# Patient Record
Sex: MALE | Race: WHITE | NOT HISPANIC OR LATINO | Employment: OTHER | URBAN - METROPOLITAN AREA
[De-identification: names, ages, dates, MRNs, and addresses within clinical notes are randomized per-mention and may not be internally consistent; named-entity substitution may affect disease eponyms.]

---

## 2017-05-16 ENCOUNTER — RECORDS - HEALTHEAST (OUTPATIENT)
Dept: LAB | Facility: CLINIC | Age: 77
End: 2017-05-16

## 2017-05-16 LAB
CHOLEST SERPL-MCNC: 130 MG/DL
FASTING STATUS PATIENT QL REPORTED: ABNORMAL
HDLC SERPL-MCNC: 38 MG/DL
LDLC SERPL CALC-MCNC: 67 MG/DL
TRIGL SERPL-MCNC: 127 MG/DL

## 2018-04-24 ENCOUNTER — HOSPITAL ENCOUNTER (OUTPATIENT)
Dept: CT IMAGING | Facility: CLINIC | Age: 78
Discharge: HOME | End: 2018-04-24
Attending: FAMILY MEDICINE | Admitting: FAMILY MEDICINE
Payer: MEDICARE

## 2018-04-24 DIAGNOSIS — S22.000A: ICD-10-CM

## 2018-04-24 PROCEDURE — 72128 CT CHEST SPINE W/O DYE: CPT

## 2018-05-08 ENCOUNTER — RECORDS - HEALTHEAST (OUTPATIENT)
Dept: LAB | Facility: CLINIC | Age: 78
End: 2018-05-08

## 2018-05-08 LAB
ALP SERPL-CCNC: 102 U/L (ref 45–120)
CALCIUM SERPL-MCNC: 8.9 MG/DL (ref 8.5–10.5)
PTH-INTACT SERPL-MCNC: 50 PG/ML (ref 10–86)

## 2018-05-09 LAB — 25(OH)D3 SERPL-MCNC: 51.5 NG/ML (ref 30–80)

## 2018-06-28 ENCOUNTER — HOSPITAL ENCOUNTER (OUTPATIENT)
Dept: BONE DENSITY | Facility: CLINIC | Age: 78
Discharge: HOME | End: 2018-06-28
Admitting: FAMILY MEDICINE
Payer: MEDICARE

## 2018-06-28 DIAGNOSIS — M81.0 OSTEOPOROSIS: ICD-10-CM

## 2018-06-28 DIAGNOSIS — M54.9 BACK PAIN: ICD-10-CM

## 2018-06-28 DIAGNOSIS — E55.9 VITAMIN D DEFICIENCY: ICD-10-CM

## 2018-06-28 PROCEDURE — 77080 DXA BONE DENSITY AXIAL: CPT

## 2018-06-29 ENCOUNTER — HOSPITAL ENCOUNTER (OUTPATIENT)
Dept: GENERAL RADIOLOGY | Facility: CLINIC | Age: 78
Discharge: HOME | End: 2018-06-29
Attending: FAMILY MEDICINE | Admitting: FAMILY MEDICINE
Payer: MEDICARE

## 2018-06-29 ENCOUNTER — HOSPITAL ENCOUNTER (OUTPATIENT)
Dept: CT IMAGING | Facility: CLINIC | Age: 78
End: 2018-06-29
Attending: FAMILY MEDICINE
Payer: MEDICARE

## 2018-06-29 DIAGNOSIS — G89.29 CHRONIC RIGHT SHOULDER PAIN: ICD-10-CM

## 2018-06-29 DIAGNOSIS — M25.511 CHRONIC RIGHT SHOULDER PAIN: ICD-10-CM

## 2018-06-29 PROCEDURE — 23350 INJECTION FOR SHOULDER X-RAY: CPT

## 2018-06-29 PROCEDURE — 73201 CT UPPER EXTREMITY W/DYE: CPT | Mod: RT

## 2018-06-29 PROCEDURE — 25500064 ZZH RX 255 OP 636: Performed by: RADIOLOGY

## 2018-06-29 PROCEDURE — 25000125 ZZHC RX 250: Performed by: RADIOLOGY

## 2018-06-29 RX ORDER — IOPAMIDOL 408 MG/ML
20 INJECTION, SOLUTION INTRATHECAL ONCE
Status: COMPLETED | OUTPATIENT
Start: 2018-06-29 | End: 2018-06-29

## 2018-06-29 RX ORDER — LIDOCAINE HYDROCHLORIDE 10 MG/ML
5 INJECTION, SOLUTION EPIDURAL; INFILTRATION; INTRACAUDAL; PERINEURAL ONCE
Status: COMPLETED | OUTPATIENT
Start: 2018-06-29 | End: 2018-06-29

## 2018-06-29 RX ADMIN — LIDOCAINE HYDROCHLORIDE 5 ML: 10 INJECTION, SOLUTION EPIDURAL; INFILTRATION; INTRACAUDAL; PERINEURAL at 14:47

## 2018-06-29 RX ADMIN — IOPAMIDOL 12 ML: 408 INJECTION, SOLUTION INTRATHECAL at 14:47

## 2018-06-29 NOTE — PROGRESS NOTES
RADIOLOGY PROCEDURE NOTE  Patient name: Shaka Aguirre  MRN: [id number]  : 1940    Pre-procedure diagnosis: Pain.  Post-procedure diagnosis: Same    Procedure Date/Time: 2018  2:43 PM  Procedure: Right shoulder intraarticular contrast injection for CT to follow.  Estimated blood loss: None  Specimen(s) collected:  None.  The patient tolerated the procedure well with no immediate complications.    See imaging dictation for procedural details.    Provider name: Zeus Staley  Assistant(s):None

## 2018-09-13 ENCOUNTER — AMBULATORY - HEALTHEAST (OUTPATIENT)
Dept: NEUROLOGY | Facility: CLINIC | Age: 78
End: 2018-09-13

## 2018-09-13 DIAGNOSIS — R41.3 MEMORY LOSS: ICD-10-CM

## 2018-09-27 ENCOUNTER — HOSPITAL ENCOUNTER (OUTPATIENT)
Dept: NEUROLOGY | Facility: CLINIC | Age: 78
Setting detail: THERAPIES SERIES
Discharge: STILL ADMITTED OR ADMINISTRATIVE | End: 2018-09-27
Attending: PSYCHIATRY & NEUROLOGY

## 2018-09-27 DIAGNOSIS — R41.3 MEMORY LOSS: ICD-10-CM

## 2018-09-27 DIAGNOSIS — G31.84 MILD NEUROCOGNITIVE DISORDER: ICD-10-CM

## 2018-10-15 ENCOUNTER — HOSPITAL ENCOUNTER (OUTPATIENT)
Dept: NEUROLOGY | Facility: CLINIC | Age: 78
Setting detail: THERAPIES SERIES
Discharge: STILL ADMITTED OR ADMINISTRATIVE | End: 2018-10-15
Attending: PSYCHIATRY & NEUROLOGY

## 2018-10-15 DIAGNOSIS — G31.84 MILD NEUROCOGNITIVE DISORDER: ICD-10-CM

## 2019-01-01 ENCOUNTER — APPOINTMENT (OUTPATIENT)
Dept: GENERAL RADIOLOGY | Facility: CLINIC | Age: 79
End: 2019-01-01
Attending: STUDENT IN AN ORGANIZED HEALTH CARE EDUCATION/TRAINING PROGRAM
Payer: COMMERCIAL

## 2019-01-01 ENCOUNTER — HOSPITAL ENCOUNTER (EMERGENCY)
Facility: CLINIC | Age: 79
Discharge: HOME | End: 2019-01-01
Attending: STUDENT IN AN ORGANIZED HEALTH CARE EDUCATION/TRAINING PROGRAM | Admitting: STUDENT IN AN ORGANIZED HEALTH CARE EDUCATION/TRAINING PROGRAM
Payer: COMMERCIAL

## 2019-01-01 VITALS
TEMPERATURE: 100 F | DIASTOLIC BLOOD PRESSURE: 69 MMHG | WEIGHT: 165 LBS | HEIGHT: 66 IN | OXYGEN SATURATION: 98 % | BODY MASS INDEX: 26.52 KG/M2 | SYSTOLIC BLOOD PRESSURE: 129 MMHG | RESPIRATION RATE: 18 BRPM

## 2019-01-01 DIAGNOSIS — S60.222A CONTUSION OF LEFT HAND, INITIAL ENCOUNTER: ICD-10-CM

## 2019-01-01 PROBLEM — E87.1 HYPONATREMIA: Status: ACTIVE | Noted: 2018-10-18

## 2019-01-01 PROBLEM — I50.22 CHRONIC SYSTOLIC CONGESTIVE HEART FAILURE (H): Status: ACTIVE | Noted: 2019-01-01

## 2019-01-01 PROBLEM — G47.33 OSA ON CPAP: Status: ACTIVE | Noted: 2018-02-15

## 2019-01-01 PROBLEM — F41.8 DEPRESSION WITH ANXIETY: Status: ACTIVE | Noted: 2018-02-15

## 2019-01-01 PROBLEM — I10 HYPERTENSION: Status: ACTIVE | Noted: 2018-02-15

## 2019-01-01 PROBLEM — I48.0 PAROXYSMAL ATRIAL FIBRILLATION (H): Status: ACTIVE | Noted: 2018-02-15

## 2019-01-01 PROBLEM — E03.9 HYPOTHYROIDISM (ACQUIRED): Status: ACTIVE | Noted: 2018-02-15

## 2019-01-01 PROBLEM — Z95.810 BIVENTRICULAR ICD (IMPLANTABLE CARDIOVERTER-DEFIBRILLATOR) IN PLACE: Status: ACTIVE | Noted: 2018-10-23

## 2019-01-01 PROBLEM — Z95.2 MITRAL VALVE REPLACED: Status: ACTIVE | Noted: 2018-02-15

## 2019-01-01 PROBLEM — M81.0 AGE-RELATED OSTEOPOROSIS WITHOUT CURRENT PATHOLOGICAL FRACTURE: Status: ACTIVE | Noted: 2018-08-09

## 2019-01-01 PROBLEM — Z79.01 ANTICOAGULATION MONITORING, INR RANGE 2-3: Status: ACTIVE | Noted: 2018-02-15

## 2019-01-01 PROBLEM — D64.9 ANEMIA: Status: ACTIVE | Noted: 2018-10-18

## 2019-01-01 PROCEDURE — 99284 EMERGENCY DEPT VISIT MOD MDM: CPT | Mod: Z6 | Performed by: STUDENT IN AN ORGANIZED HEALTH CARE EDUCATION/TRAINING PROGRAM

## 2019-01-01 PROCEDURE — 73110 X-RAY EXAM OF WRIST: CPT | Mod: LT

## 2019-01-01 PROCEDURE — 73130 X-RAY EXAM OF HAND: CPT | Mod: LT

## 2019-01-01 PROCEDURE — 25000132 ZZH RX MED GY IP 250 OP 250 PS 637: Mod: GY | Performed by: STUDENT IN AN ORGANIZED HEALTH CARE EDUCATION/TRAINING PROGRAM

## 2019-01-01 PROCEDURE — A9270 NON-COVERED ITEM OR SERVICE: HCPCS | Mod: GY | Performed by: STUDENT IN AN ORGANIZED HEALTH CARE EDUCATION/TRAINING PROGRAM

## 2019-01-01 PROCEDURE — 99284 EMERGENCY DEPT VISIT MOD MDM: CPT

## 2019-01-01 RX ORDER — OXYCODONE HYDROCHLORIDE 5 MG/1
5 TABLET ORAL ONCE
Status: COMPLETED | OUTPATIENT
Start: 2019-01-01 | End: 2019-01-01

## 2019-01-01 RX ORDER — AMIODARONE HYDROCHLORIDE 200 MG/1
200 TABLET ORAL DAILY
Status: ON HOLD | COMMUNITY
End: 2022-09-23

## 2019-01-01 RX ORDER — ATORVASTATIN CALCIUM 80 MG/1
80 TABLET, FILM COATED ORAL DAILY
COMMUNITY
End: 2024-01-16

## 2019-01-01 RX ORDER — TAMSULOSIN HYDROCHLORIDE 0.4 MG/1
0.4 CAPSULE ORAL DAILY
Status: ON HOLD | COMMUNITY
End: 2022-09-23

## 2019-01-01 RX ORDER — PANTOPRAZOLE SODIUM 40 MG/1
40 TABLET, DELAYED RELEASE ORAL DAILY
Status: ON HOLD | COMMUNITY
End: 2022-09-24

## 2019-01-01 RX ORDER — SPIRONOLACTONE 25 MG/1
25 TABLET ORAL DAILY
Status: ON HOLD | COMMUNITY
End: 2022-09-23

## 2019-01-01 RX ORDER — WARFARIN SODIUM 3 MG/1
TABLET ORAL
Status: ON HOLD | COMMUNITY
End: 2022-09-24

## 2019-01-01 RX ORDER — OXYCODONE HYDROCHLORIDE 5 MG/1
2.5-5 TABLET ORAL EVERY 6 HOURS PRN
Qty: 10 TABLET | Refills: 0 | Status: ON HOLD | OUTPATIENT
Start: 2019-01-01 | End: 2022-09-23

## 2019-01-01 RX ORDER — LEVOTHYROXINE SODIUM 75 UG/1
75 TABLET ORAL DAILY
Status: ON HOLD | COMMUNITY
End: 2022-09-23

## 2019-01-01 RX ORDER — FUROSEMIDE 40 MG
40 TABLET ORAL DAILY
Status: ON HOLD | COMMUNITY
End: 2022-09-23

## 2019-01-01 RX ORDER — FERROUS SULFATE 325(65) MG
325 TABLET ORAL 2 TIMES DAILY
COMMUNITY

## 2019-01-01 RX ADMIN — OXYCODONE HYDROCHLORIDE 5 MG: 5 TABLET ORAL at 12:13

## 2019-01-01 ASSESSMENT — MIFFLIN-ST. JEOR: SCORE: 1406.19

## 2019-01-01 NOTE — LETTER
January 1, 2019      To Whom It May Concern:      Shaka Aguirre was seen in our Emergency Department today, 01/01/19.  I expect his condition to improve over the next 3-5 days.  He may return to work when improved.    Sincerely,        Federico Hillman, DO

## 2019-01-01 NOTE — ED AVS SNAPSHOT
[facility] Emergency Department  [address] [address]  Phone:  [phone]  Fax:  [phone]                                    Shaka Aguirre   MRN: [id number]    Department:  [facility] Emergency Department   Date of Visit:  1/1/2019           After Visit Summary Signature Page    I have received my discharge instructions, and my questions have been answered. I have discussed any challenges I see with this plan with the nurse or doctor.    ..........................................................................................................................................  Patient/Patient Representative Signature      ..........................................................................................................................................  Patient Representative Print Name and Relationship to Patient    ..................................................               ................................................  Date                                   Time    ..........................................................................................................................................  Reviewed by Signature/Title    ...................................................              ..............................................  Date                                               Time          22EPIC Rev 08/18

## 2019-01-01 NOTE — ED PROVIDER NOTES
History     Chief Complaint   Patient presents with     Hand Injury     left hand crushed in train station door on Saturday, decreased rom     HPI  Shaka Aguirre is a 78 year old male with past medical history which includes osteoporosis, biventricular ICD, systolic CHF, aortic and mitral valve replacement, paroxysmal atrial fibrillation and chronic anticoagulation with warfarin therapy who presents for evaluation of left hand pain after injury 3 days ago.  Patient explains that he attempted to stop a closing door on a train but it slid closed compressing his hand.  He has had persistent achy pain described as severe for the past 3 days, no obvious bony deformity or weakness, and localizes pain to the palmar and dorsal left hand.  He has taken some previously prescribed Tylenol with codeine with limited relief.  No other injuries.  No bleeding.    Problem List:    Patient Active Problem List    Diagnosis Date Noted     Chronic systolic congestive heart failure (H) 01/01/2019     Priority: Medium     Biventricular ICD (implantable cardioverter-defibrillator) in place 10/23/2018     Priority: Medium     Overview:   Date of last device in office evaluation: 7/27/18    ?  and model:   [employer] Inogen G146  BiV ICD   * Date of implant: 12/20/16              ? Indication for device: CHF; cardiomyopathy; AVR,MVR; atrial flutter/fibrillation; ventricular tachycardia; 100% V paced    ? Cardiac resynchronization therapy:  yes  ? Battery longevity documented as less than 3  Months: no  ? Are any of the leads less than 3 months old:no    ? Programming              ? Pacing mode and programmed lower rate: DDDR 60 bpm              ? ICD therapy                     ? Lowest heart rate for shock delivery: 170bpm                     ? Lowest heart rate for ATP delivery:  170bpm    ? Rate-responsive sensor type, if programmed on: accelerometer on       Underlying rhythm and heart rate if it can be  determined:  sinus bradycardia with 1st deg AVB @ 51 bpm       * For ICD, will magnet disable detection? yes  ? Will ICD detections resume automatically with removal of  the magnet? yes                                                                        ? Any alert status on CIED generator or lead: no  ? Last pacing threshold    Atrial   1V @ 0.4ms           Ventricular   RV 1V @ 0.4ms      LV 0.9V @ 0.4ms       Anemia 10/18/2018     Priority: Medium     Hyponatremia 10/18/2018     Priority: Medium     Age-related osteoporosis without current pathological fracture 08/09/2018     Priority: Medium     Overview:   DEXA at [place] 6/28/18.  L femur -3.1.       Anticoagulation monitoring, INR range 2-3 02/15/2018     Priority: Medium     Mitral valve replaced 02/15/2018     Priority: Medium     Depression with anxiety 02/15/2018     Priority: Medium     Hypertension 02/15/2018     Priority: Medium     Hypothyroidism (acquired) 02/15/2018     Priority: Medium     MARISELA on CPAP 02/15/2018     Priority: Medium     Paroxysmal atrial fibrillation (H) 02/15/2018     Priority: Medium        Past Medical History:    No past medical history on file.    Past Surgical History:    No past surgical history on file.    Family History:    No family history on file.    Social History:  Marital Status:   [2]  Social History     Tobacco Use     Smoking status: Not on file   Substance Use Topics     Alcohol use: Not on file     Drug use: Not on file        Medications:      amiodarone (PACERONE/CODARONE) 200 MG tablet   atorvastatin (LIPITOR) 80 MG tablet   ferrous sulfate (FEROSUL) 325 (65 Fe) MG tablet   furosemide (LASIX) 40 MG tablet   levothyroxine (SYNTHROID/LEVOTHROID) 75 MCG tablet   oxyCODONE (ROXICODONE) 5 MG tablet   pantoprazole (PROTONIX) 40 MG EC tablet   spironolactone (ALDACTONE) 25 MG tablet   tamsulosin (FLOMAX) 0.4 MG capsule   warfarin (COUMADIN) 3 MG tablet         Review of Systems  Constitutional:  Negative  "for fever or recent illness.  Respiratory:  Negative for cough or shortness of breath.  Musculoskeletal: Positive for left hand pain.  Neurological: Negative for weakness or sensory deficits.  Skin: Positive for contusion of the dorsal hand.    All others reviewed and are negative.      Physical Exam   BP: 129/69  Heart Rate: 69  Temp: 100  F (37.8  C)  Resp: 18  Height: 167.6 cm (5' 6\")  Weight: 74.8 kg (165 lb)  SpO2: 98 %      Physical Exam  Constitutional:  Well developed, well nourished.  Appears nontoxic and in no acute distress.   HENT:  Normocephalic and atraumatic.  Eyes:  Conjunctivae are normal.  Neck:  Neck supple.  Cardiovascular:  No cyanosis.   Respiratory:  Effort normal, no respiratory distress.   Musculoskeletal: Contusion over dorsal aspect of left hand, primarily noted along metacarpal 1-3, but tenderness of metacarpal 4 and 5.  No obvious deformity or wound..  No snuffbox tenderness.  Sensation intact of all digits along median, radial, and ulnar nerve distributions.  FDP, FDS, and Extensor tendons intact.  No cyanosis and capillary refill less than 2 seconds in each digit.  2/4 palpable radial and ulnar pulses.  Neurological:  Patient is alert.  Skin:  Skin is warm and dry.  Psychiatric:  Normal mood and affect.      ED Course        Procedures               Critical Care time:  none               Results for orders placed or performed during the hospital encounter of 01/01/19 (from the past 24 hour(s))   XR Hand Left G/E 3 Views    Narrative    HAND THREE VIEWS LEFT  1/1/2019 12:33 PM     HISTORY: pain after crush injury 3 days ago    COMPARISON: None.    FINDINGS: There is diffuse osteopenia. Degenerative changes are seen  in the DIP and PIP joints of the fifth finger. No acute fractures are  identified..      Impression    IMPRESSION:   1. No acute fractures are identified.  2. Degenerative changes.   XR Wrist Left G/E 3 Views    Narrative    WRIST THREE VIEWS LEFT 1/1/2019 12:33 PM "     HISTORY: pain after crush injury 3 days ago    COMPARISON: None.    FINDINGS: There is normal osseous alignment.  No fractures are  identified.  Vascular calcifications are noted.      Impression    IMPRESSION: No fractures are identified.       Medications   oxyCODONE (ROXICODONE) tablet 5 mg (5 mg Oral Given 1/1/19 1213)       Assessments & Plan (with Medical Decision Making)   Shaka Aguirre is a 78 year old male who presents to the emergency department for complaint of left hand pain after injury sustained 3 days ago.  Based on his symptoms and the clinical examination, there is no evidence to suggest bony deformity, skin laceration, tendon rupture, joint laxity, compartment syndrome or neurovascular deficits.  Radiographs were independently reviewed and without identifiable fracture or other acute bony abnormality.  Radiologist read is consistent.  Clinical impression is that his symptoms are likely caused by soft tissue injury and contusion noted.  He continues to take Coumadin as prescribed, INR value would be of low utility.  Volar splint placed.  Suggest treating with rest, ice, elevation, and weightbearing as tolerated.  It was recommended that they follow up if symptoms do not readily improve over the next week as it is possible to miss some injuries despite radiographic imaging.      Disclaimer:  This note consists of symbols derived from keyboarding, dictation, and/or voice recognition software.  As a result, there may be errors in the script that have gone undetected.  Please consider this when interpreting information found in the chart.         I have reviewed the nursing notes.    I have reviewed the findings, diagnosis, plan and need for follow up with the patient.          Medication List      Started    oxyCODONE 5 MG tablet  Commonly known as:  ROXICODONE  2.5-5 mg, Oral, EVERY 6 HOURS PRN            Final diagnoses:   Contusion of left hand, initial encounter       1/1/2019   [facility]     Federico Hillman DO  01/01/19 2482     160

## 2019-01-18 ENCOUNTER — HOSPITAL ENCOUNTER (OUTPATIENT)
Dept: ULTRASOUND IMAGING | Facility: CLINIC | Age: 79
Discharge: HOME | End: 2019-01-18
Attending: FAMILY MEDICINE | Admitting: FAMILY MEDICINE
Payer: COMMERCIAL

## 2019-01-18 DIAGNOSIS — D50.9 IRON (FE) DEFICIENCY ANEMIA: ICD-10-CM

## 2019-01-18 PROCEDURE — 76700 US EXAM ABDOM COMPLETE: CPT

## 2021-06-20 NOTE — PROGRESS NOTES
The patient was seen for a neuropsychological evaluation for the purposes of diagnostic clarification and treatment planning. 150 minutes of face-to-face testing were provided by this writer. The patient was cooperative with testing. No concerns were brought to my attention. Please see Dr. More's report for a detailed description of the charges and interpretation and integration of the findings.

## 2021-06-20 NOTE — PROGRESS NOTES
"  NEUROPSYCHOLOGICAL CONSULTATION    NAME:  Shaka Aguirre  :  1940    ROGER: 2018    REASON FOR REFERRAL:  Mr. Aguirre is a 78 y.o., right-handed,  male with chronic systolic heart failure, significant left ventricular systolic dysfunction, atrial fibrillation (s/p pacemaker and defibrillator implantation), hypothyroidism, obstructive sleep apnea (with CPAP), sensorimotor neuropathy, hearing loss, degenerative arthritis in his hands, and history of rheumatic fever at age 13 with subsequent rheumatic heart disease. He also has a recent onset of bilateral hand tremor, balance issues, recent falls, and \"memory loss\" within the past two years, along with more longstanding REM sleep behavior disorder. He was evaluated by neurologist, Moon Grimm MD on 2018 for his tremor and cognitive decline. A brief cognitive screen (MOCA) was administered at that time, on which he earned a score of 27/30. Dr. Grimm's evaluation was additionally notable for bilateral positional and action tremor, and his right hand also had an occasional resting tremor. She also observed compromised coordination and steppage gait on the right side and decreased balance. He was subsequently referred for this neuropsychological evaluation by Dr. Grimm to assist with differential diagnosis and care planning.     Verbal consent for neuropsychological testing was received following the provision of information about the nature and purpose of the evaluation, and the opportunity to ask questions.     HISTORY OF PRESENTING PROBLEM:  The following information was obtained via medical record review and by interview of the patient. He came to today's appointment unaccompanied.    With regard to cognitive functioning, Mr. Aguirre reported experiencing progressive cognitive decline that began 3-4 years ago. Specifically, he mostly described difficulties while driving, including getting lost in his neighborhood, having difficulty finding " his way out of parking lots, and forgetting where he parked. He added that he has always had a poor memory, particularly with regard to recalling names of people whom he had just met. He also acknowledged occasional, mild word-finding difficulty, losing things around the home, and slightly slower processing speed when specifically queried about these potential problems, but he is not overly concerned or bothered by these issues. Problems with attention/concentration or problem solving were denied. He noted that he is anxious about this evaluation because he fears he might have Alzheimer's disease.    With regard to the activities of daily living, Mr. Aguirre reported that he is fully independent. He continues to drive, despite the onset of the problems detailed above. Recent accidents, close-calls, or traffic violations were denied. He manages his medications independently with the use of a pillbox. He pays all of his bills on the computer and denied any problems with financial management. His wife has always done most of the cooking and meal preparation. He has no issues with performing household chores or running errands.    MEDICAL HISTORY:  Mr. Aguirre's medical history is significant for several longstanding cardiac issues, that began with rheumatic heart disease secondary to rheumatic fever at age 13. He has had several aortic and mitral valve replacements, the most recent of which was complicated by a sternal staph infection in 2003. He also had a single-vessel bypass at the time of his valve replacement. He also has chronic systolic heart failure, significant left ventricular systolic dysfunction, atrial fibrillation and possible flutter. He is now status-post cardiac ablation and pacemaker and defibrillator implantation.    Medical history is additionally positive for obstructive sleep apnea (with consistent CPAP use for 15 years), sensorimotor neuropathy, hypothyroidism, hearing loss, and degenerative  "arthritis in his hands. He also has a recent onset of bilateral hand tremor, balance issues, and recent falls within the past two years, seemingly concomitant with his cognitive decline. He also reported a more longstanding REM sleep behavior disorder. The patient additionally reported history of a couple of concussions, the first occurring in 1978 when he was bucked off of a horse, and the second in 2010 after falling onto a concrete floor. Residual problems were denied.    Diagnostic studies:  Head CT was ordered but not yet completed by the time of this report.     No past surgical history on file.    Current medications include (per medical record):   Tylenol, Pacerone, Lipitor, tamsulosin, escitalopram (20mg), warfarin, spironolactone, Klor-Con, Metoprolol, levothyroxine, lisinopril, and furosemide. While cyclobenzaprine is on his medication list, the patient reported that he no longer takes it.    RELEVANT FAMILY MEDICAL HISTORY:   The patient's mother reportedly also had REM sleep behavior disorder. Family neurologic history is otherwise negative, including no family history of tremor or other parkinsonisms. He does have family history of coronary artery disease. The patient is the middle child with four siblings, all of whom are healthy.     PSYCHIATRIC HISTORY:  Currently, the patient described his mood as \"depressed most of the time.\" He has reportedly felt this way for the past couple of years. He also endorsed recent stressors, including interpersonal problems with his son and significant financial stress that has resulted in he and his wife applying for bankruptcy. Suicidal ideation was denied. He currently takes escitalopram, prescribed by his PCP, which has been helpful. He also recently established care with a psychotherapist through [facility]. He has seen her for one appointment so far. With regard to his psychiatric history, Mr. Aguirre endorsed an episode of mild " depression in the past.    With regard to sleep, he denied any problems or disturbances. He estimates that he is typically getting about nine hours of sleep per night and reported that he feels adequately energized during the day. He utilizes his CPAP nightly. As noetd above, symptoms of REM sleep behavior disorder were endorsed.    With regard to substance abuse, Mr. Aguirre reported no history of past drug or alcohol abuse or dependence.  At the present time, he reported that he typically consumes a glass of wine on most days. Other current substance use was denied.    SOCIAL HISTORY:  Mr. Aguirre was born and raised on a farm in [place], [place]. He graduated high school and earned an Associate's degree in automobile mechanics. He earned mostly Cs for grades throughout his schooling, and was always a poor speller. Significant learning difficulties or developmental attention issues were denied. 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 For the past three years, he has been employed as a part-time  at [employer] for three days a week; however, he has been on medical leave for the past month due to his shoulder injury. He is scheduled to have shoulder surgery on 10/23 and will return to work 12/1. He has been  for 57 years, and they have 3 children together. The patient and his wife live together in a rented independent senior living apartment in [place], [place]. He greatly enjoys where he lives.     SERVICES:   One hour of the neuropsychologist's time was spent performing a neurobehavioral status examination (54183); 3 hours of the neuropsychologist's time was spent in medical record review, administering the WMS-III Information and Orientation subtest, interpretating and integrating all tests,  and in report preparation (46988); and 3 hours was spent in the administration of the remainder of the testing battery by a trained examiner and interpreted by the neuropsychologist (92225). For diagnostic and coding purposes, Mr. Aguirre has a history of significant cardiovascular disease, hypothyroidism, obstructive sleep apnea (with CPAP), sensorimotor neuropathy, hearing loss, degenerative arthritis in his hands, bilateral hand tremor, balance issues, recent falls, and REM sleep behavior disorder. He was referred for an evaluation of mild neurocognitive disorder.    TESTS ADMINISTERED:   Wechsler Memory Scale-III Information/Orientation, Wechsler Adult Intelligence Scale-IV (select subtests), Wide Range Achievement Test-4 (select subtests), Wechsler Memory Test-IV (select subtests), Brief Visuospatial Memory Test-Revised, Dusty Auditory Verbal Learning Test, Trailmaking Test, Controlled Oral Word Association Test and Category Fluency, [place] Naming Test-Short Form,Clock Drawing Test, Enriqueta Hobbs Executive Functioning Test (Color Word Interference subtest), [place] Card Sorting Test-1 deck, Hunter Judgement of Line Orientation, Generalized Anxiety Disorder-7 Assessment and Geriatric Depression Scale-Short Form.    BEHAVIORAL OBSERVATIONS:   Mr. Aguirre arrived on time and unaccompanied to today's appointment. He was appropriately dressed and groomed. He appeared alert and engaged. Gait appeared stiff and he exhibited a mild tremor in his hands. No vision or hearing difficulties were observed. Conversational speech was of normal rate, volume, and prosody. Occasional word-finding pauses were noted. His thought process appeared linear and goal-directed. No hallucinations or delusions were apparent. Judgment and insight appeared intact. His mood was mildly apprehensive but euthmyic and his affect was appropriately reactive. Rapport was easily established and eye contact was appropriate. During testing, he was  alert throughout. He was pleasant and cooperative throughout the evaluation, although he was quick to become agitated on challenging tasks. He required repetition and clarification of test instructions but eventually appeared to understand all task demands. He was mildly impulsive (e.g., starting tasks before directions were completed); however, no other frontal signs were observed. Mr. Aguirre appeared adequately motivated and engaged easily during testing. His performance was fully intact on embedded measures of objective effort. Therefore, the following results are considered a valid estimation of his current cognitive abilities.    OPTIMAL PREMORBID INTELLECT:  Optimal premorbid intellectual abilities were estimated as falling in the average range based on Mr. Aguirre's educational and occupational histories and performance on tasks least likely to be affected by acquired brain dysfunction.    SUMMARY OF TEST RESULTS:  ATTENTION/WORKING MEMORY. Performance on a measure sensitive to sustained auditory-verbal attention and concentration (WAIS-IV Digit Span) was in the average range, as he was able to recite up to 6 digits forward (average), up to 4 digits backward (average), and up to 6 digits in sequence (average). On a test of complex concentration that requires speeded numeric sequencing (Trails A), the patient performed in the superior range and with one error. On a more difficult version of that task that requires speeded alpha-numeric sequencing/cognitive set-shifting (Trails B), performance was in the high average range and again, one error was recorded.     PROCESSING SPEED. Performance on a measure of psychomotor speed and visual scanning was in the average range (WAIS-IV Symbol Search). On the DKEFS Color-Word Interference Test, speeded color naming was average, speeded word reading was borderline impaired, and completion time on the inhibition subtest was average.      LANGUAGE PROCESSING. Language  comprehension appeared intact. Verbal abstract reasoning was in the average range (WAIS-IV Similarities). The patient demonstrated average performance on the Hampton Naming Test, a test of visual confrontation naming and semantic retrieval. Phonemic fluency was in the average range (COWAT), and semantic fluency was also average (Category Fluency). His writing sample was intact and there was no evidence of micrographia.     VISUOSPATIAL/CONSTRUCTIONAL SKILLS. The WAIS-IV Perceptual Reasoning Index was in the average range (pro-rated IAN = 92), with average nonverbal abstract reasoning (Matrix Reasoning), and average visuoconstruction with three-dimensional blocks (Block Design). Spatial judgment, as measured by Judgment of Line Orientation, was in the low average range. Copy of six simple geometric figures was within normal limits (BVMT-R Copy) but notable for mild tremor. On a Clock Drawing Task, the patient was able to draw a large, well-formed [place] with equally spaced and properly placed numbers, and with clock hands that converged nicely in the center of the clock face. The clock hands were also well differentiated by length and were in the correct positions.     LEARNING/MEMORY. The patient was fully oriented to personal and current information. On the WMS-IV Logical Memory subtest, immediate memory for paragraph-length stories was average, as was delayed memory with a 95% retention rate. Delayed recognition of that same material was average. On a 15-item verbal list-learning task (Dusty Auditory Verbal Learning Test), the patient acquired up to 9 words (60%) of the word list by the 5th and final learning trial (raw scores over trials = 5, 7, 6, 9, 8). Total learning acquisition was in the average range. Following a distractor list, the patient recalled 4 items, which was in the average range for his age. After a 30-minute delay, the patient recalled 8 items, which was in the average range. Recognition memory  performance was in the average range, as he recognized 13/15 items and only made one false positive error.     On a visual memory measure (BVMT-R), immediate recall of six simple geometric figures over three learning trials was in the borderline impaired range (raw scores over trials = 3, 4, 5 out of 12). Delayed recall of the figures was also borderline impaired, with an 80% retention rate (raw score = 4). Recognition discriminability was in the above average range, as he correctly recognized 6/6 figures and made 0 false positive errors.     EXECUTIVE FUNCTIONS. Concept identification and the ability to generate alternative problem solving strategies was borderline impaired for age on the Wisconsin Card Sorting Test. He completed one of three categories (low average) with a total of 42 errors, which is borderline impaired. Thirty-one of his errors were perseverative (borderline impaired) and there were no failures to maintain set. On a test of inhibition and cognitive flexibility, (DKEFS Color-Word Interference Inhibition trial), the patient made 9 errors, which is in the mildly impaired range. One error was made on a test of speeded sequencing (Trails A) and on the more difficult test of speeded cognitive set-shifting (Trails B). Verbal and nonverbal abstract reasoning were both average (WAIS-IV Similarities and Matrix Reasoning). Phonemic fluency was average, as measured by COWAT. Performance on the Clock Drawing Test was intact, as he was able to accurately represent analog time.     MOOD. On a self-report measure of depression (Geriatric Depression Scale - Short Form), the patient endorsed 12 items. This suggests depressive symptoms in the severe range. On the Generalized Anxiety Disorder-7, a self-report measure of anxiety, he obtained a score of 13,  placing him in the range of moderately severe anxiety.    DIAGNOSTIC IMPRESSIONS:  Mr. Aguirre is a 78 y.o., right-handed,  male with significant  cardiovascular disease, hypothyroidism, obstructive sleep apnea (with CPAP), sensorimotor neuropathy, hearing loss, and degenerative arthritis in his hands, along with recent onset of bilateral hand tremor, balance issues, recurrent falls, and REM sleep behavior disorder. He was referred for this neuropsychological evaluation by neurologist Moon Grimm MD, to assist with differential diagnosis and care planning.    Optimal premorbid intellectual abilities are estimated as falling in the average range, and most of Mr. Aguirre's performances are generally commensurate with that estimate. However, he exhibits very subtle difficulties on some measures of executive functioning (i.e., novel problem-solving and cognitive inhibition) along with a retrieval deficit on nonverbal memory tasks. Specifically, his learning and delayed free recall of nonverbal materials are borderline impaired; however, his recall significantly improves to perfect performance when he is provided with recognition prompts/cues. This pattern of performance suggests prefrontal dysfunction as opposed to a primary memory disorder. In contrast to this nonverbal memory performance, his learning and memory for verbal materials is entirely intact. All other cognitive domains are also intact, including attention/concentration, processing speed, visuospatial/constructional ability, language processing, and all other executive functions assessed (i.e., abstract reasoning and mental flexibility). With regard to emotional functioning, Mr. Aguirre endorses severe depressive symptoms (without suicidal ideation) and moderately severe anxiety symptoms on self-report measures.    Overall, these results are suggestive of extremely subtle cerebral dysfunction in the prefrontal regions (primarily of the nondominant hemisphere). 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 Etiology  is uncertain at this time, and neuroimaging could be beneficial in this regard. Nevertheless, along with the current cognitive profile, he has several clinical symptoms that may be consistent with an incipient underlying synucleinopathy (e.g., most likely Lewy body dementia although Parkinson's should also be considered). The patient also has numerous cerebrovascular risk factors that may be contributory; however, this is pending neuroimaging results. Mr. Aguirre does endorse a remarkable degree of depression and anxiety symptoms, which can very well exacerbate his cognitive inefficiencies to some degree but are not thought to be primary contributors at this time. His current cognitive profile does NOT support an Alzheimer's etiology, and I do not suspect any medication side effects at present.    RECOMMENDATIONS:    Ongoing neurologic care and monitoring is recommended. If not medically contraindicated, Mr. Aguirre may benefit from a trial of a cognitive enhancing medication.     Mr. Aguirre has a number of risk factors for cerebrovascular disease. Neuroimaging may be helpful in clarifying the presence or extent of possible cerebrovascular burden. Continued management of his cardiovascular health is encouraged in order to reduce the risk of further cognitive decline.     Mr. Aguirre is encouraged to continue with psychotherapeutic and psychiatric interventions to help manage his mood symptoms. He might benefit from modification to his medication; however, this recommendation will be deferred to his prescribing provider.    It is important that Mr. Aguirre continue to follow his medication and MARISELA treatment regimen so as to maintain his physical health, as this can have a significant impact on his physical, emotional, and cognitive functioning.     Mr. Aguirre reported recent difficulties with his hearing. He may benefit from an audiological evaluation to determine his need for a hearing aide or other  hearing interventions.     Given the subtlety of his cognitive difficulties on the current examination, I do not suspect Mr. Aguirre to experience significant difficulties functioning in daily life. He may benefit from the use of a GPS while driving, given that this is his biggest frustration currently.    Neuropsychological follow-up is recommended in 18-24 months, or as clinically indicated, in order to monitor the patient's cognitive status, clarify etiology, and update treatment recommendations.    Mr. Aguirre has requested to receive the results of this evaluation via a formal feedback appointment with me, which will be scheduled at the patient's convenience, typically within two weeks of today's date. Thank you for allowing me to participate in Mr. Aguirre's care. Please contact me with any questions regarding the content of this report.        Kallie More PsyD, LP  Licensed Clinical Neuropsychologist  [facility]  Neuropsychology Section   Phone:  [phone]

## 2021-06-21 NOTE — PROGRESS NOTES
NEUROPSYCHOLOGY PROGRESS NOTE    NAME: Shaka Aguirre  YOB: 1940     DATE OF EVALUATION: 10/15/2018      SUMMARY OF SESSION:  Shaka Aguirre is a 78 y.o.,  male who was referred for a cognitive evaluation by Moon Grimm MD.  Mr. Aguirre arrived on time and accompanied by his wife. We began the session by discussing his experience during the neuropsychometric evaluation.  I provided Mr. Aguirre with detailed feedback regarding his performance on cognitive testing and his pattern of cognitive strengths and weaknesses.  I discussed my overall impressions and recommendations and provided the opportunity for Mr. Aguirre to ask any questions that he had about the evaluation.  At the end of the session, he indicated that he understood the results and that I had answered all of his questions.  He was provided with my contact information, should any further questions or concerns arise in the future.    Please contact me with any questions regarding the content of this note.     Kallie More PsyD, LP  [place]ensed [place] [address]  Phone: [phone]    For coding purposes, this appointment consisted of a total of 1 hour of 64048.

## 2022-09-22 ENCOUNTER — HOSPITAL ENCOUNTER (INPATIENT)
Facility: CLINIC | Age: 82
LOS: 2 days | Discharge: HOME | DRG: 378 | End: 2022-09-24
Attending: EMERGENCY MEDICINE | Admitting: INTERNAL MEDICINE
Payer: COMMERCIAL

## 2022-09-22 DIAGNOSIS — D64.9 ANEMIA, UNSPECIFIED TYPE: ICD-10-CM

## 2022-09-22 DIAGNOSIS — K92.2 GASTROINTESTINAL HEMORRHAGE, UNSPECIFIED GASTROINTESTINAL HEMORRHAGE TYPE: ICD-10-CM

## 2022-09-22 DIAGNOSIS — Z11.52 ENCOUNTER FOR SCREENING LABORATORY TESTING FOR SEVERE ACUTE RESPIRATORY SYNDROME CORONAVIRUS 2 (SARS-COV-2): ICD-10-CM

## 2022-09-22 PROBLEM — Z95.3 STATUS POST MITRAL VALVE REPLACEMENT WITH BIOPROSTHETIC VALVE: Status: ACTIVE | Noted: 2018-02-15

## 2022-09-22 PROBLEM — Z95.3 STATUS POST AORTIC VALVE REPLACEMENT WITH BIOPROSTHETIC VALVE: Status: ACTIVE | Noted: 2022-09-22

## 2022-09-22 PROBLEM — D50.8 IRON DEFICIENCY ANEMIA SECONDARY TO INADEQUATE DIETARY IRON INTAKE: Status: ACTIVE | Noted: 2018-10-18

## 2022-09-22 PROBLEM — D69.6 THROMBOCYTOPENIA (H): Status: ACTIVE | Noted: 2020-08-25

## 2022-09-22 PROBLEM — F03.A0 MILD DEMENTIA (H): Status: ACTIVE | Noted: 2022-08-23

## 2022-09-22 PROBLEM — M15.9 GENERALIZED OSTEOARTHRITIS OF HAND: Status: ACTIVE | Noted: 2019-09-09

## 2022-09-22 LAB
ABO/RH(D): NORMAL
ALBUMIN SERPL BCG-MCNC: 3.4 G/DL (ref 3.5–5.2)
ALP SERPL-CCNC: 56 U/L (ref 40–129)
ALT SERPL W P-5'-P-CCNC: 33 U/L (ref 10–50)
ANION GAP SERPL CALCULATED.3IONS-SCNC: 11 MMOL/L (ref 7–15)
ANTIBODY SCREEN: NEGATIVE
AST SERPL W P-5'-P-CCNC: 35 U/L (ref 10–50)
BASOPHILS # BLD AUTO: 0 10E3/UL (ref 0–0.2)
BASOPHILS NFR BLD AUTO: 0 %
BILIRUB SERPL-MCNC: 0.6 MG/DL
BUN SERPL-MCNC: 50.3 MG/DL (ref 8–23)
CALCIUM SERPL-MCNC: 7.3 MG/DL (ref 8.8–10.2)
CHLORIDE SERPL-SCNC: 101 MMOL/L (ref 98–107)
CREAT SERPL-MCNC: 1.09 MG/DL (ref 0.67–1.17)
DEPRECATED HCO3 PLAS-SCNC: 22 MMOL/L (ref 22–29)
EOSINOPHIL # BLD AUTO: 0.1 10E3/UL (ref 0–0.7)
EOSINOPHIL NFR BLD AUTO: 1 %
ERYTHROCYTE [DISTWIDTH] IN BLOOD BY AUTOMATED COUNT: 17.2 % (ref 10–15)
FERRITIN SERPL-MCNC: 119 NG/ML (ref 31–409)
GFR SERPL CREATININE-BSD FRML MDRD: 67 ML/MIN/1.73M2
GLUCOSE SERPL-MCNC: 192 MG/DL (ref 70–99)
HCT VFR BLD AUTO: 24.1 % (ref 40–53)
HGB BLD-MCNC: 7.8 G/DL (ref 13.3–17.7)
HOLD SPECIMEN: NORMAL
IMM GRANULOCYTES # BLD: 0 10E3/UL
IMM GRANULOCYTES NFR BLD: 0 %
INR PPP: >10 (ref 0.85–1.15)
IRON BINDING CAPACITY (ROCHE): 345 UG/DL (ref 240–430)
IRON SATN MFR SERPL: 27 % (ref 15–46)
IRON SERPL-MCNC: 94 UG/DL (ref 61–157)
LYMPHOCYTES # BLD AUTO: 1.6 10E3/UL (ref 0.8–5.3)
LYMPHOCYTES NFR BLD AUTO: 23 %
MCH RBC QN AUTO: 33.1 PG (ref 26.5–33)
MCHC RBC AUTO-ENTMCNC: 32.4 G/DL (ref 31.5–36.5)
MCV RBC AUTO: 102 FL (ref 78–100)
MONOCYTES # BLD AUTO: 0.9 10E3/UL (ref 0–1.3)
MONOCYTES NFR BLD AUTO: 13 %
NEUTROPHILS # BLD AUTO: 4.3 10E3/UL (ref 1.6–8.3)
NEUTROPHILS NFR BLD AUTO: 63 %
NRBC # BLD AUTO: 0 10E3/UL
NRBC BLD AUTO-RTO: 0 /100
PLATELET # BLD AUTO: 135 10E3/UL (ref 150–450)
POTASSIUM SERPL-SCNC: 5.3 MMOL/L (ref 3.4–5.3)
PROT SERPL-MCNC: 5.5 G/DL (ref 6.4–8.3)
RBC # BLD AUTO: 2.36 10E6/UL (ref 4.4–5.9)
RETICS # AUTO: 0.14 10E6/UL (ref 0.03–0.1)
RETICS/RBC NFR AUTO: 5.8 % (ref 0.5–2)
SARS-COV-2 RNA RESP QL NAA+PROBE: NEGATIVE
SODIUM SERPL-SCNC: 134 MMOL/L (ref 136–145)
SPECIMEN EXPIRATION DATE: NORMAL
WBC # BLD AUTO: 6.9 10E3/UL (ref 4–11)

## 2022-09-22 PROCEDURE — 85045 AUTOMATED RETICULOCYTE COUNT: CPT | Performed by: EMERGENCY MEDICINE

## 2022-09-22 PROCEDURE — 85610 PROTHROMBIN TIME: CPT | Performed by: FAMILY MEDICINE

## 2022-09-22 PROCEDURE — 85004 AUTOMATED DIFF WBC COUNT: CPT | Performed by: FAMILY MEDICINE

## 2022-09-22 PROCEDURE — 36415 COLL VENOUS BLD VENIPUNCTURE: CPT | Performed by: FAMILY MEDICINE

## 2022-09-22 PROCEDURE — 86901 BLOOD TYPING SEROLOGIC RH(D): CPT | Performed by: EMERGENCY MEDICINE

## 2022-09-22 PROCEDURE — 80053 COMPREHEN METABOLIC PANEL: CPT | Performed by: FAMILY MEDICINE

## 2022-09-22 PROCEDURE — 82728 ASSAY OF FERRITIN: CPT | Performed by: EMERGENCY MEDICINE

## 2022-09-22 PROCEDURE — 82040 ASSAY OF SERUM ALBUMIN: CPT | Performed by: FAMILY MEDICINE

## 2022-09-22 PROCEDURE — 120N000001 HC R&B MED SURG/OB

## 2022-09-22 PROCEDURE — 258N000003 HC RX IP 258 OP 636: Performed by: EMERGENCY MEDICINE

## 2022-09-22 PROCEDURE — 96365 THER/PROPH/DIAG IV INF INIT: CPT | Performed by: EMERGENCY MEDICINE

## 2022-09-22 PROCEDURE — 86850 RBC ANTIBODY SCREEN: CPT | Performed by: EMERGENCY MEDICINE

## 2022-09-22 PROCEDURE — 99285 EMERGENCY DEPT VISIT HI MDM: CPT | Mod: 25 | Performed by: EMERGENCY MEDICINE

## 2022-09-22 PROCEDURE — 87635 SARS-COV-2 COVID-19 AMP PRB: CPT | Performed by: EMERGENCY MEDICINE

## 2022-09-22 PROCEDURE — 99285 EMERGENCY DEPT VISIT HI MDM: CPT | Performed by: EMERGENCY MEDICINE

## 2022-09-22 PROCEDURE — C9113 INJ PANTOPRAZOLE SODIUM, VIA: HCPCS | Performed by: EMERGENCY MEDICINE

## 2022-09-22 PROCEDURE — 99223 1ST HOSP IP/OBS HIGH 75: CPT | Mod: AI | Performed by: PHYSICIAN ASSISTANT

## 2022-09-22 PROCEDURE — 82607 VITAMIN B-12: CPT | Performed by: EMERGENCY MEDICINE

## 2022-09-22 PROCEDURE — 82746 ASSAY OF FOLIC ACID SERUM: CPT | Performed by: EMERGENCY MEDICINE

## 2022-09-22 PROCEDURE — 36415 COLL VENOUS BLD VENIPUNCTURE: CPT | Performed by: EMERGENCY MEDICINE

## 2022-09-22 PROCEDURE — 83550 IRON BINDING TEST: CPT | Performed by: EMERGENCY MEDICINE

## 2022-09-22 PROCEDURE — C9803 HOPD COVID-19 SPEC COLLECT: HCPCS | Performed by: EMERGENCY MEDICINE

## 2022-09-22 PROCEDURE — 86923 COMPATIBILITY TEST ELECTRIC: CPT

## 2022-09-22 PROCEDURE — 96375 TX/PRO/DX INJ NEW DRUG ADDON: CPT | Performed by: EMERGENCY MEDICINE

## 2022-09-22 PROCEDURE — 250N000011 HC RX IP 250 OP 636: Performed by: EMERGENCY MEDICINE

## 2022-09-22 RX ADMIN — PHYTONADIONE 10 MG: 10 INJECTION, EMULSION INTRAMUSCULAR; INTRAVENOUS; SUBCUTANEOUS at 22:59

## 2022-09-22 RX ADMIN — PANTOPRAZOLE SODIUM 40 MG: 40 INJECTION, POWDER, FOR SOLUTION INTRAVENOUS at 22:54

## 2022-09-22 ASSESSMENT — ACTIVITIES OF DAILY LIVING (ADL): ADLS_ACUITY_SCORE: 35

## 2022-09-22 NOTE — LETTER
Shaka Aguirre  [address]   [place] [address]  September 29, 2022    Dear Shaka,   This letter is to inform you of the results of your pathology report on your upper endoscopy (EGD).    Your pathology report was:  Showed findings consistent with a mildly inflamed stomach, I suspect this was related to your NSAID use (Motrin/Ibuprofen). If you continue to have symptoms please follow up with your primary care doctor or with myself.      A. Stomach, antrum, biopsy  -Antral  type gastric mucosa with inactive chronic gastritis.  -Negative for H. Pylori organisms on immunohistochemical stains.  - Negative for intestinal metaplasia.   -Negative for dysplasia or malignancy     If you have any questions or concerns please do not hesitate to call my office at [phone].  Sincerely,   Min Rodriguez, DO   [facility]

## 2022-09-23 ENCOUNTER — ANESTHESIA EVENT (OUTPATIENT)
Dept: GASTROENTEROLOGY | Facility: CLINIC | Age: 82
DRG: 378 | End: 2022-09-23
Payer: COMMERCIAL

## 2022-09-23 ENCOUNTER — ANESTHESIA (OUTPATIENT)
Dept: GASTROENTEROLOGY | Facility: CLINIC | Age: 82
DRG: 378 | End: 2022-09-23
Payer: COMMERCIAL

## 2022-09-23 LAB
BLD PROD TYP BPU: NORMAL
BLOOD COMPONENT TYPE: NORMAL
CODING SYSTEM: NORMAL
CROSSMATCH: NORMAL
FOLATE SERPL-MCNC: 12.8 NG/ML (ref 4.6–34.8)
HGB BLD-MCNC: 7 G/DL (ref 13.3–17.7)
HGB BLD-MCNC: 7.1 G/DL (ref 13.3–17.7)
HGB BLD-MCNC: 8.5 G/DL (ref 13.3–17.7)
HOLD SPECIMEN: NORMAL
INR PPP: 2.3 (ref 0.85–1.15)
ISSUE DATE AND TIME: NORMAL
UNIT ABO/RH: NORMAL
UNIT NUMBER: NORMAL
UNIT STATUS: NORMAL
UNIT TYPE ISBT: 6200
UPPER GI ENDOSCOPY: NORMAL
VIT B12 SERPL-MCNC: 452 PG/ML (ref 232–1245)

## 2022-09-23 PROCEDURE — 85610 PROTHROMBIN TIME: CPT | Performed by: PHYSICIAN ASSISTANT

## 2022-09-23 PROCEDURE — 250N000009 HC RX 250: Performed by: NURSE ANESTHETIST, CERTIFIED REGISTERED

## 2022-09-23 PROCEDURE — 258N000003 HC RX IP 258 OP 636: Performed by: NURSE ANESTHETIST, CERTIFIED REGISTERED

## 2022-09-23 PROCEDURE — 370N000017 HC ANESTHESIA TECHNICAL FEE, PER MIN: Performed by: SURGERY

## 2022-09-23 PROCEDURE — 88342 IMHCHEM/IMCYTCHM 1ST ANTB: CPT | Mod: 26 | Performed by: PATHOLOGY

## 2022-09-23 PROCEDURE — 250N000013 HC RX MED GY IP 250 OP 250 PS 637: Performed by: PHYSICIAN ASSISTANT

## 2022-09-23 PROCEDURE — 88305 TISSUE EXAM BY PATHOLOGIST: CPT | Mod: 26 | Performed by: PATHOLOGY

## 2022-09-23 PROCEDURE — 250N000011 HC RX IP 250 OP 636: Performed by: NURSE ANESTHETIST, CERTIFIED REGISTERED

## 2022-09-23 PROCEDURE — 85018 HEMOGLOBIN: CPT

## 2022-09-23 PROCEDURE — 85018 HEMOGLOBIN: CPT | Performed by: PHYSICIAN ASSISTANT

## 2022-09-23 PROCEDURE — 120N000001 HC R&B MED SURG/OB

## 2022-09-23 PROCEDURE — 36415 COLL VENOUS BLD VENIPUNCTURE: CPT

## 2022-09-23 PROCEDURE — 0DB68ZX EXCISION OF STOMACH, VIA NATURAL OR ARTIFICIAL OPENING ENDOSCOPIC, DIAGNOSTIC: ICD-10-PCS | Performed by: SURGERY

## 2022-09-23 PROCEDURE — P9016 RBC LEUKOCYTES REDUCED: HCPCS

## 2022-09-23 PROCEDURE — 250N000013 HC RX MED GY IP 250 OP 250 PS 637: Performed by: SURGERY

## 2022-09-23 PROCEDURE — 99233 SBSQ HOSP IP/OBS HIGH 50: CPT

## 2022-09-23 PROCEDURE — 43239 EGD BIOPSY SINGLE/MULTIPLE: CPT | Performed by: SURGERY

## 2022-09-23 PROCEDURE — 36415 COLL VENOUS BLD VENIPUNCTURE: CPT | Performed by: PHYSICIAN ASSISTANT

## 2022-09-23 PROCEDURE — C9113 INJ PANTOPRAZOLE SODIUM, VIA: HCPCS | Performed by: SURGERY

## 2022-09-23 PROCEDURE — 88305 TISSUE EXAM BY PATHOLOGIST: CPT | Mod: TC | Performed by: SURGERY

## 2022-09-23 PROCEDURE — 250N000011 HC RX IP 250 OP 636: Performed by: SURGERY

## 2022-09-23 RX ORDER — ONDANSETRON 2 MG/ML
4 INJECTION INTRAMUSCULAR; INTRAVENOUS EVERY 6 HOURS PRN
Status: DISCONTINUED | OUTPATIENT
Start: 2022-09-23 | End: 2022-09-24 | Stop reason: HOSPADM

## 2022-09-23 RX ORDER — PROCHLORPERAZINE MALEATE 5 MG
5 TABLET ORAL EVERY 6 HOURS PRN
Status: DISCONTINUED | OUTPATIENT
Start: 2022-09-23 | End: 2022-09-24 | Stop reason: HOSPADM

## 2022-09-23 RX ORDER — PROPOFOL 10 MG/ML
INJECTION, EMULSION INTRAVENOUS PRN
Status: DISCONTINUED | OUTPATIENT
Start: 2022-09-23 | End: 2022-09-23

## 2022-09-23 RX ORDER — ONDANSETRON 4 MG/1
4 TABLET, ORALLY DISINTEGRATING ORAL EVERY 6 HOURS PRN
Status: DISCONTINUED | OUTPATIENT
Start: 2022-09-23 | End: 2022-09-24 | Stop reason: HOSPADM

## 2022-09-23 RX ORDER — SODIUM CHLORIDE, SODIUM LACTATE, POTASSIUM CHLORIDE, CALCIUM CHLORIDE 600; 310; 30; 20 MG/100ML; MG/100ML; MG/100ML; MG/100ML
INJECTION, SOLUTION INTRAVENOUS CONTINUOUS PRN
Status: DISCONTINUED | OUTPATIENT
Start: 2022-09-23 | End: 2022-09-23

## 2022-09-23 RX ORDER — FUROSEMIDE 40 MG
80 TABLET ORAL 2 TIMES DAILY
Status: DISCONTINUED | OUTPATIENT
Start: 2022-09-23 | End: 2022-09-24 | Stop reason: HOSPADM

## 2022-09-23 RX ORDER — AMIODARONE HYDROCHLORIDE 100 MG/1
100 TABLET ORAL DAILY
Status: DISCONTINUED | OUTPATIENT
Start: 2022-09-23 | End: 2022-09-24 | Stop reason: HOSPADM

## 2022-09-23 RX ORDER — LIDOCAINE 40 MG/G
CREAM TOPICAL
Status: DISCONTINUED | OUTPATIENT
Start: 2022-09-23 | End: 2022-09-24 | Stop reason: HOSPADM

## 2022-09-23 RX ORDER — TIZANIDINE 2 MG/1
2 TABLET ORAL AT BEDTIME
COMMUNITY
Start: 2022-06-15

## 2022-09-23 RX ORDER — ACETAMINOPHEN 325 MG/1
650 TABLET ORAL EVERY 6 HOURS PRN
Status: DISCONTINUED | OUTPATIENT
Start: 2022-09-23 | End: 2022-09-24 | Stop reason: HOSPADM

## 2022-09-23 RX ORDER — FERROUS SULFATE 325(65) MG
325 TABLET ORAL 2 TIMES DAILY
Status: DISCONTINUED | OUTPATIENT
Start: 2022-09-23 | End: 2022-09-24 | Stop reason: HOSPADM

## 2022-09-23 RX ORDER — FUROSEMIDE 80 MG
80 TABLET ORAL DAILY
COMMUNITY
Start: 2022-09-07

## 2022-09-23 RX ORDER — PROCHLORPERAZINE 25 MG
12.5 SUPPOSITORY, RECTAL RECTAL EVERY 12 HOURS PRN
Status: DISCONTINUED | OUTPATIENT
Start: 2022-09-23 | End: 2022-09-24 | Stop reason: HOSPADM

## 2022-09-23 RX ORDER — LEVOTHYROXINE SODIUM 88 UG/1
88 TABLET ORAL
Status: DISCONTINUED | OUTPATIENT
Start: 2022-09-23 | End: 2022-09-24 | Stop reason: HOSPADM

## 2022-09-23 RX ORDER — SPIRONOLACTONE 25 MG
12.5 TABLET ORAL DAILY
Status: DISCONTINUED | OUTPATIENT
Start: 2022-09-23 | End: 2022-09-24 | Stop reason: HOSPADM

## 2022-09-23 RX ORDER — LIDOCAINE HYDROCHLORIDE 10 MG/ML
INJECTION, SOLUTION EPIDURAL; INFILTRATION; INTRACAUDAL; PERINEURAL PRN
Status: DISCONTINUED | OUTPATIENT
Start: 2022-09-23 | End: 2022-09-23

## 2022-09-23 RX ORDER — ATORVASTATIN CALCIUM 80 MG/1
80 TABLET, FILM COATED ORAL DAILY
Status: DISCONTINUED | OUTPATIENT
Start: 2022-09-23 | End: 2022-09-24 | Stop reason: HOSPADM

## 2022-09-23 RX ORDER — GLYCOPYRROLATE 0.2 MG/ML
INJECTION, SOLUTION INTRAMUSCULAR; INTRAVENOUS PRN
Status: DISCONTINUED | OUTPATIENT
Start: 2022-09-23 | End: 2022-09-23

## 2022-09-23 RX ORDER — METOPROLOL SUCCINATE 25 MG/1
25 TABLET, EXTENDED RELEASE ORAL DAILY
COMMUNITY
Start: 2022-09-02 | End: 2024-01-16

## 2022-09-23 RX ORDER — AMIODARONE HYDROCHLORIDE 100 MG/1
100 TABLET ORAL DAILY
COMMUNITY
Start: 2022-08-11 | End: 2024-01-16

## 2022-09-23 RX ORDER — AMOXICILLIN 250 MG
1 CAPSULE ORAL 2 TIMES DAILY PRN
Status: DISCONTINUED | OUTPATIENT
Start: 2022-09-23 | End: 2022-09-24 | Stop reason: HOSPADM

## 2022-09-23 RX ORDER — LIDOCAINE 40 MG/G
CREAM TOPICAL
Status: DISCONTINUED | OUTPATIENT
Start: 2022-09-23 | End: 2022-09-23

## 2022-09-23 RX ORDER — SPIRONOLACTONE 25 MG/1
12.5 TABLET ORAL
COMMUNITY
Start: 2022-08-17

## 2022-09-23 RX ORDER — LEVOTHYROXINE SODIUM 88 UG/1
88 TABLET ORAL DAILY
COMMUNITY
Start: 2022-09-08 | End: 2024-01-16

## 2022-09-23 RX ORDER — FERROUS SULFATE 325(65) MG
325 TABLET ORAL EVERY OTHER DAY
Status: DISCONTINUED | OUTPATIENT
Start: 2022-09-24 | End: 2022-09-23

## 2022-09-23 RX ORDER — AMOXICILLIN 250 MG
2 CAPSULE ORAL 2 TIMES DAILY PRN
Status: DISCONTINUED | OUTPATIENT
Start: 2022-09-23 | End: 2022-09-24 | Stop reason: HOSPADM

## 2022-09-23 RX ORDER — ALENDRONATE SODIUM 70 MG/1
70 TABLET ORAL WEEKLY
COMMUNITY
Start: 2022-06-28

## 2022-09-23 RX ADMIN — METOPROLOL TARTRATE 12.5 MG: 25 TABLET, FILM COATED ORAL at 08:28

## 2022-09-23 RX ADMIN — PROPOFOL 50 MG: 10 INJECTION, EMULSION INTRAVENOUS at 12:19

## 2022-09-23 RX ADMIN — SODIUM CHLORIDE, POTASSIUM CHLORIDE, SODIUM LACTATE AND CALCIUM CHLORIDE: 600; 310; 30; 20 INJECTION, SOLUTION INTRAVENOUS at 12:20

## 2022-09-23 RX ADMIN — GLYCOPYRROLATE 0.2 MG: 0.2 INJECTION, SOLUTION INTRAMUSCULAR; INTRAVENOUS at 12:19

## 2022-09-23 RX ADMIN — LIDOCAINE HYDROCHLORIDE 50 MG: 10 INJECTION, SOLUTION EPIDURAL; INFILTRATION; INTRACAUDAL; PERINEURAL at 12:19

## 2022-09-23 RX ADMIN — FERROUS SULFATE TAB 325 MG (65 MG ELEMENTAL FE) 325 MG: 325 (65 FE) TAB at 19:52

## 2022-09-23 RX ADMIN — METOPROLOL TARTRATE 12.5 MG: 25 TABLET, FILM COATED ORAL at 19:52

## 2022-09-23 RX ADMIN — TOPICAL ANESTHETIC 1 SPRAY: 200 SPRAY DENTAL; PERIODONTAL at 12:18

## 2022-09-23 RX ADMIN — FLUOXETINE 20 MG: 20 CAPSULE ORAL at 08:28

## 2022-09-23 RX ADMIN — LEVOTHYROXINE SODIUM 88 MCG: 0.09 TABLET ORAL at 06:15

## 2022-09-23 RX ADMIN — FERROUS SULFATE TAB 325 MG (65 MG ELEMENTAL FE) 325 MG: 325 (65 FE) TAB at 13:26

## 2022-09-23 RX ADMIN — PANTOPRAZOLE SODIUM 40 MG: 40 INJECTION, POWDER, FOR SOLUTION INTRAVENOUS at 13:26

## 2022-09-23 RX ADMIN — AMIODARONE HYDROCHLORIDE 100 MG: 100 TABLET ORAL at 08:29

## 2022-09-23 RX ADMIN — ATORVASTATIN CALCIUM 80 MG: 80 TABLET, FILM COATED ORAL at 08:28

## 2022-09-23 ASSESSMENT — ACTIVITIES OF DAILY LIVING (ADL)
WALKING_OR_CLIMBING_STAIRS_DIFFICULTY: NO
HEARING_DIFFICULTY_OR_DEAF: YES
EQUIPMENT_CURRENTLY_USED_AT_HOME: OTHER (SEE COMMENTS)
DIFFICULTY_COMMUNICATING: NO
ADLS_ACUITY_SCORE: 22
DRESSING/BATHING_DIFFICULTY: NO
ADLS_ACUITY_SCORE: 24
DIFFICULTY_EATING/SWALLOWING: NO
THE_FOLLOWING_AIDS_WERE_PROVIDED;: PATIENT DECLINED OFFER OF AUXILIARY AIDS
ADLS_ACUITY_SCORE: 24
WERE_AUXILIARY_AIDS_OFFERED?: YES
ADLS_ACUITY_SCORE: 24
ADLS_ACUITY_SCORE: 24
TOILETING_ISSUES: NO
DOING_ERRANDS_INDEPENDENTLY_DIFFICULTY: NO
VISION_MANAGEMENT: GLASSES
CONCENTRATING,_REMEMBERING_OR_MAKING_DECISIONS_DIFFICULTY: NO
FALL_HISTORY_WITHIN_LAST_SIX_MONTHS: NO
ADLS_ACUITY_SCORE: 24
CHANGE_IN_FUNCTIONAL_STATUS_SINCE_ONSET_OF_CURRENT_ILLNESS/INJURY: YES
DESCRIBE_HEARING_LOSS: HEARING LOSS ON RIGHT SIDE;HEARING LOSS ON LEFT SIDE
USE_OF_HEARING_ASSISTIVE_DEVICES: BILATERAL HEARING AIDS
ADLS_ACUITY_SCORE: 24
WEAR_GLASSES_OR_BLIND: YES

## 2022-09-23 NOTE — H&P
[facility]    History and Physical - Hospitalist Service       Date of Admission:  9/22/2022    Assessment & Plan      Shaka Aguirre is a 82 year old male admitted on 9/22/2022. He has history of paroxysmal atrial fibrillation s/p/BiV-ICD, systolic heart failure, bioprosthetic aortic and mitral valves, coronary artery disease status post CABG, hypertension, hypothyroidism, depression with anxiety, and mild dementia.  He presents due to supratherapeutic INR and melena over the past 2 days.    GI Bleed, likely upper  2 days of melena.  1 week of increased fatigue, shortness of breath and intermittent lightheadedness when standing up. On presentation Hgb 7.8, INR > 10.0, BUN elevated with normal creatinine, hemodynamically stable. Received 10 mg IV vitamin K in ED along with pantoprazole.    Likely upper GI bleed in the setting of warfarin use and elevated INR.  Also takes ibuprofen twice daily.  ED discussed with surgery, will consider endoscopy and possibly colonoscopy.  - IV pantoprazole 40 mg Q12 hours  - Surgery consult, likely endoscopy  - NPO  - serial hemoglobins, transfuse for Hgb < 7 as below  - hold warfarin, reversed with 10 mg IV vitamin K in ED  - advised patient to stop all NSAIDs    Acute Blood Loss Anemia on chronic anemia  Iron Deficiency  On presentation Hgb 7.8 with concerns of upper GI bleed as above. Last Hgb 10.0 on 8/23/22. Hgb baseline likely around 12. Recent B12 and folate normal. History of iron deficiency managed on ferrous sulfate twice daily. Iron studies normal on presentation. Per PCP note history of EGD, colonoscopy and PillCam were unrevealing (able to review EGD/colonoscopy from 2018).   Acute anemia likely due to blood loss in the setting of suspected upper GI bleed with supratherapeutic INR.  - recheck hemoglobin 4 hours prior to initial then Q6 hours  - transfuse for hemoglobin < 7 or sooner pending signs of significant blood loss or hemodynamic  instability  - manage GI bleed as above    Supratherapeutic INR, history of labile INR  Acquired coagulopathy secondary to warfarin use  INR > 10.0 on presentation, 11.2 per outside lab. History of labile INR, up to 9.6 on 8/5/22. Recently evaluated thyroid function and attempted to optimize CHF to see if these were causing labile INR.  - hold warfarin, reversed with 10 mg IV vitamin K in ED  - follow INR Q8 hours    Paroxsymal Atrial Fibrillation  S/P BiV ICD  Managed with amiodarone and warfarin for stroke risk reduction though has had issues with easy bruising and labile INRs. Interested in [facility], discussed at last cardiology visit.  - continue home amiodarone  - warfarin management as above    Chronic Systolic Heart Failure   Bioprosthetic aortic and mitral valve replacements  History of rheumatic heart disease  Last echocardiogram on 9/2020 demonstrated an EF of 35-40% and regional wall motion abnormalities, decreased RV systolic function, bovine aortic and mitral valves, see report for full details appeared similar to echo from 2018.  Recent concern for exacerbation, furosemide increased from 80 mg daily to twice daily.  Weight is down about 12-13 pounds since this change.  - Hold home furosemide, spironolactone initially with GI bleed  - not currently on ACEi or beta blocker, defer to outpatient    - Follow daily weights  - Judicious use of IVF  - Monitor closely for signs of volume overload if requires blood products    Dyspnea on exertion  Currently under evaluation by cardiology as well as pulmonology.  There was some concern for CHF exacerbation and he underwent diuresis with about a 12-13 pound weight loss.  Pulmonology is currently evaluated for possible fibrosis and notes he has been on amiodarone since at least 2016.  -Continue outpatient evaluation    Coronary Artery Disease  Previously diagnosed, no current concerning symptoms. History of single vessel CABG (LIMA to LAD).  - continue home  "atorvastatin    Hypertension  Chronic. Blood pressures reviewed, stable.   - hold home furosemide, spironolactone  - change metoprolol succinate to tartrate with current GI bleed    Chronic Thrombocytopenia  Platelets 135 on presentation. Recent baseline 115-187.  - AM CBC    Hypothyroidism  Chronic.   - continue home levothyroxine    Depression with anxiety  Chronic.  - continue home fluoxetine     Mild Dementia  Evaluated by neuropsych 5/2022 per review of chart.       Diet: NPO per Anesthesia Guidelines for Procedure/Surgery Except for: Meds  DVT Prophylaxis: Pneumatic Compression Devices  Paul Catheter: Not present  Central Lines: None  Cardiac Monitoring: None  Code Status: Full Code , Discussed directly on admission    Clinically Significant Risk Factors Present on Admission               # Coagulation Defect: home medication list includes an anticoagulant medication      # Overweight: Estimated body mass index is 25.69 kg/m  as calculated from the following:    Height as of this encounter: 1.575 m (5' 2\").    Weight as of this encounter: 63.7 kg (140 lb 6.9 oz).        Disposition Plan      Expected Discharge Date: 09/24/2022                The patient's care was discussed with the Bedside Nurse and Patient.    Val Orourke PA-C  Hospitalist Service  [facility]  Securely message with the Vocera Web Console (learn more here)  Text page via [facility] Paging/Directory   ______________________________________________________________________    Chief Complaint   Shortness of breath, supratherapeutic INR    History is obtained from the patient, review of chart    History of Present Illness   Shaka Aguirre is a 82 year old male who presents due to concerns of supratherapeutic INR and progressive shortness of breath.    He states over the past several months he has had progressive shortness of breath, particularly with activity as well as fatigue.  Today he went for an INR check " and it was found to be elevated.  The patient reports it was at 9 though review of chart shows value of 11.2.  He has noticed black stools over the past 2 days.  He reports having 1 formed bowel movement daily.  He believes about 4 or 5 days ago his stools were brown.  He denies any bright red blood per rectum.  He is uncertain if he has had a history of this before though I see he has had EGD, colonoscopy and pill study in the setting of anemia previously.  Unclear if he had melena at that time.  No etiology was found for anemia per review of PCP note.    He feels in the past week his shortness of breath has been slightly worse as has his fatigue.  When he stands up he feels lightheaded at times, he feels this may be worse in the past week as well.  He has noticed increasing lower extremity edema over the past month.  He has been taking increased doses of his furosemide and initially had good decrease in his weight though it has started going back up.  He otherwise denies fevers, chills, headache, URI symptoms, chest pains, palpitations, abdominal pain, nausea, vomiting, diarrhea, urinary changes.    Review of Systems    The 10 point Review of Systems is negative other than noted in the HPI or here.     Past Medical History    I have reviewed this patient's medical history and updated it with pertinent information if needed.   Past Medical History:   Diagnosis Date     Age-related osteoporosis without current pathological fracture 8/9/2018    Overview:  DEXA at [place] 6/28/18.  L femur -3.1.     Biventricular ICD (implantable cardioverter-defibrillator) in place 10/23/2018    Overview:  Date of last device in office evaluation: 7/27/18  ?  and model:   A [employer] Inogen G146  BiV ICD  * Date of implant: 12/20/16             ? Indication for device: CHF; cardiomyopathy; AVR,MVR; atrial flutter/fibrillation; ventricular tachycardia; 100% V paced  ? Cardiac resynchronization therapy:  yes ?  Battery longevity documented as less than 3  Months: no ? Are a     Chronic systolic congestive heart failure (H) 1/1/2019     Depression with anxiety 2/15/2018     Hypertension 2/15/2018     Hypothyroidism (acquired) 2/15/2018     Iron deficiency anemia secondary to inadequate dietary iron intake 10/18/2018    Formatting of this note might be different from the original. Negative EGD, colonoscopy, PillCam.     Mild dementia (H) 8/23/2022    Formatting of this note might be different from the original. 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     MARISELA on CPAP 2/15/2018     Paroxysmal atrial fibrillation (H) 2/15/2018     Status post aortic valve replacement with bioprosthetic valve 9/22/2022     Status post mitral valve replacement with bioprosthetic valve 2/15/2018     Thrombocytopenia (H) 8/25/2020     Past Surgical History   I have reviewed this patient's surgical history and updated it with pertinent information if needed.  Past Surgical History:   Procedure Laterality Date     AORTIC VALVE REPLACEMENT      bioprosthetic     BLEPHAROPLASTY       BYPASS GRAFT ARTERY CORONARY      single vessel (LIMA to LAD)     COLONOSCOPY  2018     EGD  2018     IMPLANT PACEMAKER       MITRAL VALVE REPLACEMENT      bioprosthetic     RELEASE TRIGGER FINGER Bilateral 2020     TOTAL HIP ARTHROPLASTY  2011     Social History   I have reviewed this patient's social history and updated it with pertinent information if needed.  He lives in [place] with his wife.   Social History     Tobacco Use     Smoking status: Never Smoker     Smokeless tobacco: Never Used   Vaping Use     Vaping Use: Never used   Substance Use Topics     Alcohol use: Yes     Alcohol/week: 7.0 standard drinks     Types: 7 Glasses of wine per week     Comment: 1 glass/nightly       Family History   I have reviewed this patient's family history and updated it with pertinent information if needed.  Family  History   Problem Relation Age of Onset     Asthma Mother      Heart Failure Father      Myocardial Infarction Father      Prior to Admission Medications   Prior to Admission Medications   Prescriptions Last Dose Informant Patient Reported? Taking?   EUTHYROX 88 MCG tablet   Yes No   Sig: Take 88 mcg by mouth daily   FLUoxetine (PROZAC) 20 MG capsule   Yes No   Sig: Take 20 mg by mouth daily   PACERONE 100 MG TABS tablet   Yes No   Sig: Take 100 mg by mouth daily   alendronate (FOSAMAX) 70 MG tablet   Yes Yes   Sig: Take 70 mg by mouth once a week   atorvastatin (LIPITOR) 80 MG tablet   Yes No   Sig: Take 80 mg by mouth daily   ferrous sulfate (FEROSUL) 325 (65 Fe) MG tablet   Yes No   Sig: Take 325 mg by mouth 2 times daily   furosemide (LASIX) 80 MG tablet   Yes Yes   Sig: Take 80 mg by mouth 2 times daily   metoprolol succinate ER (TOPROL XL) 25 MG 24 hr tablet   Yes No   Sig: Take 25 mg by mouth daily   pantoprazole (PROTONIX) 40 MG EC tablet   Yes No   Sig: Take 40 mg by mouth daily   spironolactone (ALDACTONE) 25 MG tablet   Yes Yes   Sig: Take 12.5 mg by mouth   tiZANidine (ZANAFLEX) 2 MG tablet   Yes No   Sig: Take 2 mg by mouth At Bedtime   warfarin (COUMADIN) 3 MG tablet   Yes No   Sig: Take by mouth 3 mg (1 tablet) Mon-Wed-Fri, 1.5 mg (1/2 tablet) all other days or as directed      Facility-Administered Medications: None     Allergies   Allergies   Allergen Reactions     Pollen Extract        Physical Exam   Vital Signs: Temp: 97.9  F (36.6  C) Temp src: Oral BP: 127/55 Pulse: 66   Resp: 18 SpO2: 97 % O2 Device: None (Room air)    Weight: 140 lbs 6.93 oz    Constitutional: Laying down comfortably in bed, well-appearing, awake, alert, cooperative, no apparent distress, and appears stated age  ENT: Oropharynx is clear and moist  Respiratory: Lungs clear to auscultation bilaterally without wheeze crackle or rhonchi.  No respiratory distress.  Speaking in full sentences.  Cardiovascular: Regular rate and  rhythm.  Bilateral lower extremity edema 2+ to just above the knee.  Murmur appreciated consistent with valve replacement.  GI: Soft, nondistended, nontender, very slight tenderness to palpation of the epigastric region otherwise nontender.  Genitounirinary: Deferred  Skin: Warm and dry  Musculoskeletal: Normal bulk and tone.  Moving all 4 extremities appropriately.  Neurologic: awake, alert, oriented.  Neuropsychiatric: Calm, pleasant, cooperative.  Appropriate thought process and content.  Short-term memory grossly intact.    Data   Data reviewed today: I reviewed all medications, new labs and imaging results over the last 24 hours. I personally reviewed no images or EKG's today.    Recent Labs   Lab 09/22/22  2150   WBC 6.9   HGB 7.8*   *   *   INR >10.00*   *   POTASSIUM 5.3   CHLORIDE 101   CO2 22   BUN 50.3*   CR 1.09   ANIONGAP 11   MAIDA 7.3*   *   ALBUMIN 3.4*   PROTTOTAL 5.5*   BILITOTAL 0.6   ALKPHOS 56   ALT 33   AST 35     No results found for this or any previous visit (from the past 24 hour(s)).

## 2022-09-23 NOTE — ANESTHESIA CARE TRANSFER NOTE
Patient: Shaka Aguirre    Procedure: Procedure(s):  ESOPHAGOGASTRODUODENOSCOPY, WITH BIOPSY       Diagnosis: Gastrointestinal hemorrhage, unspecified gastrointestinal hemorrhage type [K92.2]  Diagnosis Additional Information: No value filed.    Anesthesia Type:   General     Note:    Oropharynx: spontaneously breathing  Level of Consciousness: drowsy  Oxygen Supplementation: room air    Independent Airway: airway patency satisfactory and stable  Dentition: dentition unchanged  Vital Signs Stable: post-procedure vital signs reviewed and stable  Report to RN Given: handoff report given  Patient transferred to: Phase II    Handoff Report: Identifed the Patient, Identified the Reponsible Provider, Reviewed the pertinent medical history, Discussed the surgical course, Reviewed Intra-OP anesthesia mangement and issues during anesthesia, Set expectations for post-procedure period and Allowed opportunity for questions and acknowledgement of understanding      Vitals:  Vitals Value Taken Time   BP     Temp     Pulse     Resp     SpO2 92 % 09/23/22 1234   Vitals shown include unvalidated device data.    Electronically Signed By: RORY Roche CRNA  September 23, 2022  12:35 PM

## 2022-09-23 NOTE — ED PROVIDER NOTES
History     Chief Complaint   Patient presents with     Rectal Bleeding     HPI  Shaka Aguirre is a 82 year old male with a history of atrial fibrillation with biventricular ICD on amiodarone, rheumatic heart disease s/p tissue mitral valve replacement and aortic valve replacement on warfarin, and chronic systolic congestive heart failure who presents for concerns of blood in his stools.  The patient reports that he has had black stools for the past 2 days, 1 stool each day.  He had his INR checked today in clinic and it was quite elevated at 11.2.  He was told to come to the emergency department for evaluation.  The patient says that he has been feeling more short of breath and rundown over the past several months, getting progressively worse.  He otherwise feels well and denies feeling sick recently.  No fever or chills.  No headache, abdominal pain, chest pain, nausea, vomiting.  Reviews of his medical record shows he was recently evaluated for possible watchman procedure.    Allergies:  No Known Allergies    Problem List:    Patient Active Problem List    Diagnosis Date Noted     Gastrointestinal hemorrhage, unspecified gastrointestinal hemorrhage type 09/22/2022     Priority: Medium     Status post aortic valve replacement with bioprosthetic valve 09/22/2022     Priority: Medium     Mild dementia 08/23/2022     Priority: Medium     Formatting of this note might be different from the original.  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       Thrombocytopenia (H) 08/25/2020     Priority: Medium     Generalized osteoarthritis of hand 09/09/2019     Priority: Medium     Chronic systolic congestive heart failure (H) 01/01/2019     Priority: Medium     Biventricular ICD (implantable cardioverter-defibrillator) in place 10/23/2018     Priority: Medium     Overview:   Date of last device in office evaluation: 7/27/18    ?  and model:   Rebls  [employer] G146  BiV ICD   * Date of implant: 12/20/16              ? Indication for device: CHF; cardiomyopathy; AVR,MVR; atrial flutter/fibrillation; ventricular tachycardia; 100% V paced    ? Cardiac resynchronization therapy:  yes  ? Battery longevity documented as less than 3  Months: no  ? Are any of the leads less than 3 months old:no    ? Programming              ? Pacing mode and programmed lower rate: DDDR 60 bpm              ? ICD therapy                     ? Lowest heart rate for shock delivery: 170bpm                     ? Lowest heart rate for ATP delivery:  170bpm    ? Rate-responsive sensor type, if programmed on: accelerometer on       Underlying rhythm and heart rate if it can be determined:  sinus bradycardia with 1st deg AVB @ 51 bpm       * For ICD, will magnet disable detection? yes  ? Will ICD detections resume automatically with removal of  the magnet? yes                                                                        ? Any alert status on CIED generator or lead: no  ? Last pacing threshold    Atrial   1V @ 0.4ms           Ventricular   RV 1V @ 0.4ms      LV 0.9V @ 0.4ms       Anemia 10/18/2018     Priority: Medium     Hyponatremia 10/18/2018     Priority: Medium     Iron deficiency anemia secondary to inadequate dietary iron intake 10/18/2018     Priority: Medium     Formatting of this note might be different from the original.  Negative EGD, colonoscopy, PillCam.       Age-related osteoporosis without current pathological fracture 08/09/2018     Priority: Medium     Overview:   DEXA at Max 6/28/18.  L femur -3.1.       Anticoagulation monitoring, INR range 2-3 02/15/2018     Priority: Medium     Status post mitral valve replacement with bioprosthetic valve 02/15/2018     Priority: Medium     Depression with anxiety 02/15/2018     Priority: Medium     Hypertension 02/15/2018     Priority: Medium     Hypothyroidism (acquired) 02/15/2018     Priority: Medium     MARISELA on  CPAP 02/15/2018     Priority: Medium     Paroxysmal atrial fibrillation (H) 02/15/2018     Priority: Medium        Past Medical History:    Past Medical History:   Diagnosis Date     Age-related osteoporosis without current pathological fracture 8/9/2018     Biventricular ICD (implantable cardioverter-defibrillator) in place 10/23/2018     Chronic systolic congestive heart failure (H) 1/1/2019     Depression with anxiety 2/15/2018     Hypertension 2/15/2018     Hypothyroidism (acquired) 2/15/2018     Iron deficiency anemia secondary to inadequate dietary iron intake 10/18/2018     Mild dementia (H) 8/23/2022     MARISELA on CPAP 2/15/2018     Paroxysmal atrial fibrillation (H) 2/15/2018     Status post aortic valve replacement with bioprosthetic valve 9/22/2022     Status post mitral valve replacement with bioprosthetic valve 2/15/2018     Thrombocytopenia (H) 8/25/2020       Past Surgical History:    Past Surgical History:   Procedure Laterality Date     AORTIC VALVE REPLACEMENT      bioprosthetic     BLEPHAROPLASTY       BYPASS GRAFT ARTERY CORONARY      single vessel (LIMA to LAD)     COLONOSCOPY  2018     EGD  2018     IMPLANT PACEMAKER       MITRAL VALVE REPLACEMENT      bioprosthetic     RELEASE TRIGGER FINGER Bilateral 2020     TOTAL HIP ARTHROPLASTY  2011       Family History:    Family History   Problem Relation Age of Onset     Asthma Mother      Heart Failure Father      Myocardial Infarction Father        Social History:  Marital Status:   [2]  Social History     Tobacco Use     Smoking status: Never Smoker     Smokeless tobacco: Never Used   Vaping Use     Vaping Use: Never used   Substance Use Topics     Alcohol use: Yes     Alcohol/week: 7.0 standard drinks     Types: 7 Glasses of wine per week     Comment: 1 glass/nightly        Medications:    amiodarone (PACERONE/CODARONE) 200 MG tablet  atorvastatin (LIPITOR) 80 MG tablet  ferrous sulfate (FEROSUL) 325 (65 Fe) MG tablet  furosemide (LASIX) 40 MG  "tablet  levothyroxine (SYNTHROID/LEVOTHROID) 75 MCG tablet  oxyCODONE (ROXICODONE) 5 MG tablet  pantoprazole (PROTONIX) 40 MG EC tablet  spironolactone (ALDACTONE) 25 MG tablet  tamsulosin (FLOMAX) 0.4 MG capsule  warfarin (COUMADIN) 3 MG tablet          Review of Systems  Pertinent positives and negatives listed in the HPI, all other systems reviewed and are negative.    Physical Exam   BP: 118/80  Pulse: 95  Temp: 97.7  F (36.5  C)  Resp: 26  Height: 157.5 cm (5' 2\")  Weight: 62.1 kg (137 lb)  SpO2: 98 %      Physical Exam  Vitals and nursing note reviewed.   Constitutional:       Appearance: He is well-developed. He is not diaphoretic.   HENT:      Head: Normocephalic and atraumatic.      Right Ear: External ear normal.      Left Ear: External ear normal.      Nose: Nose normal.   Eyes:      General: No scleral icterus.     Conjunctiva/sclera: Conjunctivae normal.   Cardiovascular:      Rate and Rhythm: Normal rate and regular rhythm.      Heart sounds: Murmur heard.   Pulmonary:      Effort: Pulmonary effort is normal. No respiratory distress.      Breath sounds: No stridor.   Abdominal:      General: There is no distension.      Palpations: Abdomen is soft.      Tenderness: There is no abdominal tenderness. There is no guarding or rebound.   Musculoskeletal:      Cervical back: Normal range of motion.      Right lower leg: Edema present.      Left lower leg: Edema present.   Skin:     General: Skin is warm and dry.      Coloration: Skin is pale.   Neurological:      Mental Status: He is alert and oriented to person, place, and time.   Psychiatric:         Behavior: Behavior normal.         ED Course                 Procedures              Critical Care time:  none               Results for orders placed or performed during the hospital encounter of 09/22/22 (from the past 24 hour(s))   CBC with Platelets & Differential    Narrative    The following orders were created for panel order CBC with Platelets & " Differential.  Procedure                               Abnormality         Status                     ---------                               -----------         ------                     CBC with platelets and d...[[id number]]  Abnormal            Final result                 Please view results for these tests on the individual orders.   Comprehensive metabolic panel   Result Value Ref Range    Sodium 134 (L) 136 - 145 mmol/L    Potassium 5.3 3.4 - 5.3 mmol/L    Chloride 101 98 - 107 mmol/L    Carbon Dioxide (CO2) 22 22 - 29 mmol/L    Anion Gap 11 7 - 15 mmol/L    Urea Nitrogen 50.3 (H) 8.0 - 23.0 mg/dL    Creatinine 1.09 0.67 - 1.17 mg/dL    Calcium 7.3 (L) 8.8 - 10.2 mg/dL    Glucose 192 (H) 70 - 99 mg/dL    Alkaline Phosphatase 56 40 - 129 U/L    AST 35 10 - 50 U/L    ALT 33 10 - 50 U/L    Protein Total 5.5 (L) 6.4 - 8.3 g/dL    Albumin 3.4 (L) 3.5 - 5.2 g/dL    Bilirubin Total 0.6 <=1.2 mg/dL    GFR Estimate 67 >60 mL/min/1.73m2   INR   Result Value Ref Range    INR >10.00 (HH) 0.85 - 1.15   CBC with platelets and differential   Result Value Ref Range    WBC Count 6.9 4.0 - 11.0 10e3/uL    RBC Count 2.36 (L) 4.40 - 5.90 10e6/uL    Hemoglobin 7.8 (L) 13.3 - 17.7 g/dL    Hematocrit 24.1 (L) 40.0 - 53.0 %     (H) 78 - 100 fL    MCH 33.1 (H) 26.5 - 33.0 pg    MCHC 32.4 31.5 - 36.5 g/dL    RDW 17.2 (H) 10.0 - 15.0 %    Platelet Count 135 (L) 150 - 450 10e3/uL    % Neutrophils 63 %    % Lymphocytes 23 %    % Monocytes 13 %    % Eosinophils 1 %    % Basophils 0 %    % Immature Granulocytes 0 %    NRBCs per 100 WBC 0 <1 /100    Absolute Neutrophils 4.3 1.6 - 8.3 10e3/uL    Absolute Lymphocytes 1.6 0.8 - 5.3 10e3/uL    Absolute Monocytes 0.9 0.0 - 1.3 10e3/uL    Absolute Eosinophils 0.1 0.0 - 0.7 10e3/uL    Absolute Basophils 0.0 0.0 - 0.2 10e3/uL    Absolute Immature Granulocytes 0.0 <=0.4 10e3/uL    Absolute NRBCs 0.0 10e3/uL   [facility] Draw    Narrative    The following orders were created for panel order  [facility] Draw.  Procedure                               Abnormality         Status                     ---------                               -----------         ------                     Extra Purple Top Tube[[id number]]                            Final result                 Please view results for these tests on the individual orders.   Extra Purple Top Tube   Result Value Ref Range    Hold Specimen [facility]    ABO/Rh type and screen    Narrative    The following orders were created for panel order ABO/Rh type and screen.  Procedure                               Abnormality         Status                     ---------                               -----------         ------                     Adult Type and Screen[[id number]]                            Edited Result - FINAL        Please view results for these tests on the individual orders.   Reticulocyte count   Result Value Ref Range    % Reticulocyte 5.8 (H) 0.5 - 2.0 %    Absolute Reticulocyte 0.136 (H) 0.025 - 0.095 10e6/uL   Ferritin   Result Value Ref Range    Ferritin 119 31 - 409 ng/mL   Iron & Iron Binding Capacity   Result Value Ref Range    Iron 94 61 - 157 ug/dL    Iron Sat Index 27 15 - 46 %    Iron Binding Capacity 345 240 - 430 ug/dL   Adult Type and Screen   Result Value Ref Range    ABO/RH(D) A POS     Antibody Screen Negative Negative    SPECIMEN EXPIRATION DATE [id number]    Asymptomatic COVID-19 Virus (Coronavirus) by PCR Nasopharyngeal    Specimen: Nasopharyngeal; Swab   Result Value Ref Range    SARS CoV2 PCR Negative Negative    Narrative    Testing was performed using the [employer]  SARS-CoV-2 & Influenza A/B Assay on the [employer]  [employer]  System.  This test should be ordered for the detection of SARS-COV-2 in individuals who meet SARS-CoV-2 clinical and/or epidemiological criteria. Test performance is unknown in asymptomatic patients.  This test is for in vitro diagnostic use under the FDA EUA for laboratories certified under CLIA to perform  moderate and/or high complexity testing. This test has not been FDA cleared or approved.  A negative test does not rule out the presence of PCR inhibitors in the specimen or target RNA in concentration below the limit of detection for the assay. The possibility of a false negative should be considered if the patient's recent exposure or clinical presentation suggests COVID-19.  [facility] are certified under the Clinical Laboratory Improvement Amendments of 1988 (CLIA-88) as qualified to perform moderate and/or high complexity laboratory testing.       Medications   HOLD: warfarin (COUMADIN) therapy (has no administration in time range)   pantoprazole (PROTONIX) IV push injection 40 mg (40 mg Intravenous Given 9/22/22 7911)   phytonadione 10 mg in sodium chloride 0.9 % 50 mL intermittent infusion (0 mg Intravenous Stopped 9/22/22 2336)       Assessments & Plan (with Medical Decision Making)   82-year-old male with a history of atrial fibrillation with biventricular ICD on amiodarone, rheumatic heart disease s/p tissue mitral valve replacement and aortic valve replacement on warfarin, and chronic systolic congestive heart failure who presents for concerns of blood in his stools.  Blood pressure is 113/61, temperature is 36.5  C, heart is 79, SPO2 is 97% on room air.  His INR here is greater than 10.  Hemoglobin is 7.8, this has trended downward from 10 one month ago.  His reticulocyte count is 5.8 and his TIBC is 345.  The patient's BUN is elevated at 50.3 with a normal creatinine, this likely indicates digested blood in the patient says that he takes ibuprofen daily.  This likely represents an upper GI bleed.  Started on IV pantoprazole.  Given that his hemoglobin is 7.8 but that he has normal blood pressure and heart rate and is not actively losing significant muscle blood I think it is appropriate to hold off on transfusion.  I have sent further blood tests looking for anemia and he appears to  have appropriate iron stores.  No indication for iron transfusion at this time.  At this point I do believe that he is best served admitted to the hospital for close observation and management.  I discussed the case with the on-call surgeon, Dr. Rodriguez who agrees to evaluate the patient tomorrow and possibly take him for endoscopy and colonoscopy.  I have also discussed the case with the hospitalist PA, Larissa Orourke who agrees to accept the patient to her service.    I have reviewed the nursing notes.    I have reviewed the findings, diagnosis, plan and need for follow up with the patient.       New Prescriptions    No medications on file       Final diagnoses:   Gastrointestinal hemorrhage, unspecified gastrointestinal hemorrhage type   Anemia, unspecified type       9/22/2022   [facility] EMERGENCY DEPT     Christopher Joyner MD  09/23/22 0027

## 2022-09-23 NOTE — PROGRESS NOTES
[facility] Medicine Progress Note  Date of Service: 09/23/2022    Assessment & Plan          Shaka Aguirre is a 82 year old male admitted on 9/22/2022. He has history of paroxysmal atrial fibrillation S/P BiV-ICD and on warfarin, systolic heart failure, bioprosthetic aortic and mitral valves, coronary artery disease status post CABG, hypertension, hypothyroidism, depression with anxiety, and mild dementia.  He presented due to supratherapeutic INR and melena over the previous 2 days.     GI Bleed, likely upper source  Patient who takes warfarin for stroke prevention and atrial fibrillation as well as ibuprofen twice daily presented with 2 days of melena, 1 week of increased fatigue, shortness of breath and intermittent lightheadedness when standing up. On presentation Hgb 7.8, INR > 10.0.  Received 10 mg IV vitamin K 10 mg IV in ED along with pantoprazole.    ED physician discussed this patient with Dr. Rodriguez, general surgery, and Dr. Rodriguez then discussed matters with me.  Dr. Rodriguez plans on upper endoscopy early afternoon today.  I reviewed the plans for endoscopy with the patient this morning; he expresses understanding of the procedure and wishes to proceed.  -Continue pantoprazole 40 mg IV Q12 hours  -Remains NPO for endoscopy.  -Ibuprofen and other NSAIDs will be stopped at least until endoscopy results are available.  -Warfarin remains on hold; Dr. Rodriguez advised me that he is able to perform endoscopy at this morning's INR of 2.30.  -See management of anemia below.     Acute anemia due to gastrointestinal loss superimposed on chronic anemia  Iron Deficiency  On presentation Hgb 7.8 with concerns of upper GI bleed as above. Most recent Hgb 10.0 on 8/23/22, previous baseline Hgb appears to have been around 12. Recent B12 and folate normal. History of iron deficiency managed on ferrous sulfate twice daily. Iron studies normal on presentation. Per PCP note  "history of EGD, colonoscopy and PillCam were unrevealing (able to review EGD/colonoscopy from 2018).     Hgb trend since admission: 7.8 --> 7.0 --> 7.1.  The patient continues to report global weakness and dizziness when up and walking.    -Discussed risk versus benefit of PRBC transfusion.  The patient agrees to receive 1 unit, and has signed consent for transfusion.  -Will continue to monitor hemoglobin twice daily.  -See discussion of probable GI bleed above.     Supratherapeutic INR, history of labile INR  Acquired coagulopathy secondary to warfarin use  INR > 10.0 on presentation, 11.2 per outside lab. History of labile INR, up to 9.6 on 8/5/2022.  Received vitamin K 10 mg IV x1 in ED 9/22/2022.  INR this morning is 2.30.  -Discussed with Dr. Rodriguez, surgery.  No additional INR reversal is necessary to permit endoscopy this afternoon.  -Warfarin remains on hold.  If active bleeding is found on endoscopy, we will probably not be able to resume warfarin at least for a week or two.     Paroxsymal Atrial Fibrillation  S/P BiV ICD  Managed with amiodarone as well as warfarin for stroke risk reduction though has had issues with easy bruising and labile INRs. Interested in Watchman, discussed at last cardiology visit.  The patient tells me that cardiology wanted to \"hold off\" on Watchman at least for the present.  If his warfarin needs to be held based upon this episode of probable GI bleeding, that may tilt risk/benefit more in favor of Watchman.  -Continue preadmission amiodarone.  -Warfarin discussed above.     Chronic Systolic Heart Failure   Bioprosthetic aortic and mitral valve replacements  History of rheumatic heart disease  Last echocardiogram on 9/2020 demonstrated an EF of 35-40% and regional wall motion abnormalities, decreased RV systolic function, bovine aortic and mitral valves, not significantly changed by comparison to a 2018 study, see report for full details.  Recent concern for CHF exacerbation, " "furosemide increased from 80 mg daily to twice daily.  The patient reports that his weight decreased about 12-13 pounds since that dose change.  -Preadmission furosemide, spironolactone are being held while active GI bleed suspected.  -Not currently on ACEi or beta blocker.   -Follow daily weights.  -Judicious use of IVF.  -Monitor closely for signs of volume overload if requires blood products.     Dyspnea on exertion  Currently under evaluation by cardiology as well as pulmonology.  There was some concern for CHF exacerbation and he underwent diuresis with about a 12-13 pound weight loss.  Pulmonology is currently evaluated for possible fibrosis and notes he has been on amiodarone since at least 2016.  His chief complaint is that of \"congestion\", by which I think he means a persistent productive cough.  He has no oxygen requirement here.  -Continue outpatient evaluation.     Coronary Artery Disease  History of single vessel CABG (LIMA to LAD).  Reports no recent exertional chest pain.  -Continue preadmission atorvastatin.  -Continue metoprolol, though he has been changed to the titrate form for now, see below under hypertension.     Hypertension  Managed prior to admission with furosemide 80 mg twice daily, spironolactone 12.5 mg daily, and metoprolol ER 25 mg daily.  Diuretics have been held here since admission out of concern for active GI bleed.  His metoprolol was changed to the tartrate form to make it more easily titrated, currently at a dose of 12.5 mg twice daily.  Blood pressure has been normal since admission.  -Will continue to hold furosemide and spironolactone until active GI bleed is excluded.  -Continue metoprolol tartrate 12.5 mg twice daily.  We will probably change back to his preadmission succinate form at discharge.     Chronic Thrombocytopenia  Recent baseline 115-187.  Platelets 135 on admission.  -Follow platelets.     Hypothyroidism  TSH was elevated at 7.37 on 8/16/2022, though free T4 was " "normal on that date.  -Continue home levothyroxine.     Depression with anxiety  Chronic.  Reports stable recent mood.  -Continue home fluoxetine.     Mild Dementia  MOCA was 27/30 in 2018.  He was evaluated by neuropsych 5/2022 per review of chart, though the note from that evaluation is not viewable.  I do not perceive any significant dementia upon my visit with him today.  -Observe.           Diet: NPO per Anesthesia Guidelines for Procedure/Surgery Except for: Meds  DVT Prophylaxis: Pneumatic Compression Devices  Paul Catheter: Not present  Central Lines: None  Cardiac Monitoring: None  Code Status: Full Code , Discussed directly on admission         Discussion: INR has been reversed to 2.30, which is an acceptable INR for upper endoscopy today according to Dr. Rodriguez.  Warfarin is on hold.  He has been advised to stop all NSAIDs at least for now.  Further therapy will be determined by findings on upper endoscopy.    Disposition: Anticipate discharge in 1 to 2 additional days, largely dictated by findings on endoscopy.    Attestation:  I have reviewed today's vital signs, notes, medications, labs and imaging.  Amount of time performed on this hospital visit: 40 minutes.    Adrien Stevenson MD   [facility] Medicine      Interval History   Denies abdominal pain.  He has not had a BM since admission he has no nausea or vomiting, and is actually hungry.  He has \"congestion\" in his chest, by which she is referring to a chronic, bothersome, productive cough, which is being evaluated as an outpatient.  He denies chest pain, palpitations, or orthopnea.    Physical Exam   Temp:  [97.7  F (36.5  C)-98.8  F (37.1  C)] 98.8  F (37.1  C)  Pulse:  [59-95] 62  Resp:  [16-26] 18  BP: (101-127)/(50-85) 110/62  SpO2:  [96 %-99 %] 96 %    Weights:   Vitals:    09/22/22 2128 09/23/22 0043   Weight: 62.1 kg (137 lb) 63.7 kg (140 lb 6.9 oz)    Body mass index is 25.69 kg/m .    GENERAL: Pleasant man, lying flat in bed.  He " appears comfortable.  EYES: Eyes grossly normal to inspection, extraocular movements intact  HENT: Nares patent bilaterally, no discharge.   NECK: Trachea midline, no stridor.    RESP: No accessory muscle use.  Lungs clear throughout on inspiration and expiration.  Expiration not prolonged, no wheeze.  CV: Regular rate and rhythm, non-tachycardic.  Normal S1 S2, grade I/VI holosystolic murmur heard best at the upper sternal borders and at the apex, no extra sound.  No lower extremity edema.  ABDOMEN: Soft, non-tender, no guarding.  Liver and spleen not enlarged, no masses palpable.  Bowel sounds positive.  MS: No bony deformities noted.  No red or inflamed joints.  SKIN: Warm and dry, no rashes.  NEURO: Alert, oriented, conversant.  Cranial nerves III - XII grossly intact.  No gross motor or sensory deficits.    PSYCH: Calm, alert, conversant.  Able to articulate logical thoughts, no tangential thoughts, no hallucinations or delusions.  Affect normal.        Data   Recent Labs   Lab 09/23/22  0605 09/23/22  0136 09/22/22  2150   WBC  --   --  6.9   HGB 7.1* 7.0* 7.8*   MCV  --   --  102*   PLT  --   --  135*   INR 2.30*  --  >10.00*   NA  --   --  134*   POTASSIUM  --   --  5.3   CHLORIDE  --   --  101   CO2  --   --  22   BUN  --   --  50.3*   CR  --   --  1.09   ANIONGAP  --   --  11   MAIDA  --   --  7.3*   GLC  --   --  192*   ALBUMIN  --   --  3.4*   PROTTOTAL  --   --  5.5*   BILITOTAL  --   --  0.6   ALKPHOS  --   --  56   ALT  --   --  33   AST  --   --  35       Recent Labs   Lab 09/22/22  2150   *        Unresulted Labs Ordered in the Past 30 Days of this Admission     No orders found for last 31 day(s).           Imaging  No results found for this or any previous visit (from the past 24 hour(s)).     I reviewed all new labs and imaging results over the last 24 hours. I personally reviewed no images or EKG's today.    Medications       amiodarone  100 mg Oral Daily     atorvastatin  80 mg Oral Daily      [START ON 9/24/2022] ferrous sulfate  325 mg Oral Every Other Day     FLUoxetine  20 mg Oral Daily     [Held by provider] furosemide  80 mg Oral BID     levothyroxine  88 mcg Oral QAM AC     metoprolol tartrate  12.5 mg Oral BID     pantoprazole  40 mg Intravenous Q12H     sodium chloride (PF)  3 mL Intracatheter Q8H     [Held by provider] spironolactone  12.5 mg Oral Daily       Adrien Stevenson MD    [facility] Medicine

## 2022-09-23 NOTE — CONSULTS
"82 year old year old male here for upper endoscopy for melena.  Patient has taken Ibuprofen and is on Coumadin.  He admits dark stools and dyspnea over past week.  He was found to have guaic positive stools.  Last colonoscopy was \"many years ago.\"  No history of upper endoscopy.        Patient Active Problem List   Diagnosis     Age-related osteoporosis without current pathological fracture     Anemia     Anticoagulation monitoring, INR range 2-3     Status post mitral valve replacement with bioprosthetic valve     Biventricular ICD (implantable cardioverter-defibrillator) in place     Chronic systolic congestive heart failure (H)     Depression with anxiety     Hypertension     Hyponatremia     Hypothyroidism (acquired)     MARISELA on CPAP     Paroxysmal atrial fibrillation (H)     Gastrointestinal hemorrhage, unspecified gastrointestinal hemorrhage type     Iron deficiency anemia secondary to inadequate dietary iron intake     Mild dementia     Thrombocytopenia (H)     Generalized osteoarthritis of hand     Status post aortic valve replacement with bioprosthetic valve       Past Medical History:   Diagnosis Date     Age-related osteoporosis without current pathological fracture 8/9/2018    Overview:  DEXA at [place] 6/28/18.  L femur -3.1.     Biventricular ICD (implantable cardioverter-defibrillator) in place 10/23/2018    Overview:  Date of last device in office evaluation: 7/27/18  ?  and model:   Beat[employer] Inogen G146  BiV ICD  * Date of implant: 12/20/16             ? Indication for device: CHF; cardiomyopathy; AVR,MVR; atrial flutter/fibrillation; ventricular tachycardia; 100% V paced  ? Cardiac resynchronization therapy:  yes ? Battery longevity documented as less than 3  Months: no ? Are a     Chronic systolic congestive heart failure (H) 1/1/2019     Depression with anxiety 2/15/2018     Hypertension 2/15/2018     Hypothyroidism (acquired) 2/15/2018     Iron deficiency anemia secondary to " inadequate dietary iron intake 10/18/2018    Formatting of this note might be different from the original. Negative EGD, colonoscopy, PillCam.     Mild dementia (H) 8/23/2022    Formatting of this note might be different from the original. 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     MARISELA on CPAP 2/15/2018     Paroxysmal atrial fibrillation (H) 2/15/2018     Status post aortic valve replacement with bioprosthetic valve 9/22/2022     Status post mitral valve replacement with bioprosthetic valve 2/15/2018     Thrombocytopenia (H) 8/25/2020       Past Surgical History:   Procedure Laterality Date     AORTIC VALVE REPLACEMENT      bioprosthetic     BLEPHAROPLASTY       BYPASS GRAFT ARTERY CORONARY      single vessel (LIMA to LAD)     COLONOSCOPY  2018     EGD  2018     IMPLANT PACEMAKER       MITRAL VALVE REPLACEMENT      bioprosthetic     RELEASE TRIGGER FINGER Bilateral 2020     TOTAL HIP ARTHROPLASTY  2011       Family History   Problem Relation Age of Onset     Asthma Mother      Heart Failure Father      Myocardial Infarction Father        No current outpatient medications on file.       Allergies   Allergen Reactions     Pollen Extract        Pt reports that he has never smoked. He has never used smokeless tobacco. He reports current alcohol use of about 7.0 standard drinks of alcohol per week.    Exam:    Awake, Alert OX3  Lungs - CTA bilaterally  CV - RRR, no murmurs, distal pulses intact  Abd - soft, non-distended, non-tender, +BS  Extr - No cyanosis or edema    A/P 82 year old year old male in need of upper endoscopy for melena. Risks, benefits, alternatives, and complications were discussed including the possibility of perforation and the patient agreed to proceed.    Min Rodriguez, DO on 9/23/2022 at 11:42 AM

## 2022-09-23 NOTE — ANESTHESIA PREPROCEDURE EVALUATION
Anesthesia Pre-Procedure Evaluation    Patient: Shaka Aguirre   MRN: [id number] : 1940        Procedure : Procedure(s):  ESOPHAGOGASTRODUODENOSCOPY, WITH HEMORRHAGE CONTROL          Past Medical History:   Diagnosis Date     Age-related osteoporosis without current pathological fracture 2018    Overview:  DEXA at [place] 18.  L femur -3.1.     Biventricular ICD (implantable cardioverter-defibrillator) in place 10/23/2018    Overview:  Date of last device in office evaluation: 18  ?  and model:   [employer] Inogen G146  BiV ICD  * Date of implant: 16             ? Indication for device: CHF; cardiomyopathy; AVR,MVR; atrial flutter/fibrillation; ventricular tachycardia; 100% V paced  ? Cardiac resynchronization therapy:  yes ? Battery longevity documented as less than 3  Months: no ? Are a     Chronic systolic congestive heart failure (H) 2019     Depression with anxiety 2/15/2018     Hypertension 2/15/2018     Hypothyroidism (acquired) 2/15/2018     Iron deficiency anemia secondary to inadequate dietary iron intake 10/18/2018    Formatting of this note might be different from the original. Negative EGD, colonoscopy, PillCam.     Mild dementia (H) 2022    Formatting of this note might be different from the original. 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     MARISELA on CPAP 2/15/2018     Paroxysmal atrial fibrillation (H) 2/15/2018     Status post aortic valve replacement with bioprosthetic valve 2022     Status post mitral valve replacement with bioprosthetic valve 2/15/2018     Thrombocytopenia (H) 2020      Past Surgical History:   Procedure Laterality Date     AORTIC VALVE REPLACEMENT      bioprosthetic     BLEPHAROPLASTY       BYPASS GRAFT ARTERY CORONARY      single vessel (LIMA to LAD)     COLONOSCOPY  2018     EGD  2018     IMPLANT PACEMAKER       MITRAL VALVE REPLACEMENT       bioprosthetic     RELEASE TRIGGER FINGER Bilateral 2020     TOTAL HIP ARTHROPLASTY  2011      Allergies   Allergen Reactions     Pollen Extract       Social History     Tobacco Use     Smoking status: Never Smoker     Smokeless tobacco: Never Used   Substance Use Topics     Alcohol use: Yes     Alcohol/week: 7.0 standard drinks     Types: 7 Glasses of wine per week     Comment: 1 glass/nightly      Wt Readings from Last 1 Encounters:   09/23/22 63.7 kg (140 lb 6.9 oz)        Anesthesia Evaluation            ROS/MED HX  ENT/Pulmonary:     (+) sleep apnea, uses CPAP,     Neurologic:     (+) dementia,     Cardiovascular:     (+) hypertension-----Taking blood thinners CHF (Last echocardiogram on 9/2020 demonstrated an EF of 35-40% and regional wall motion abnormalities, decreased RV systolic function, bovine aortic and mitral valves, not significantly changed by comparison to a 2018 study, see report for full details) pacemaker ( and model:   eROI Inogen G146  BiV ICD  * Date of implant: 12/20/16             ? Indication for device: CHF; cardiomyopathy; AVR,MVR; atrial flutter/fibrillation; ventricular tachycardia; 100% V paced  ), settings: Vpaced ,     METS/Exercise Tolerance:     Hematologic:     (+) anemia,     Musculoskeletal:       GI/Hepatic: Comment: Gastrointestinal hemorrhage      Renal/Genitourinary:       Endo:     (+) thyroid problem, hypothyroidism,     Psychiatric/Substance Use:     (+) psychiatric history anxiety and depression     Infectious Disease:       Malignancy:       Other:            Physical Exam    Airway  airway exam normal      Mallampati: II   TM distance: > 3 FB   Neck ROM: full   Mouth opening: > 3 cm    Respiratory Devices and Support         Dental  no notable dental history         Cardiovascular   cardiovascular exam normal          Pulmonary   pulmonary exam normal                OUTSIDE LABS:  CBC:   Lab Results   Component Value Date    WBC 6.9 09/22/2022     HGB 7.1 (L) 09/23/2022    HGB 7.0 (L) 09/23/2022    HCT 24.1 (L) 09/22/2022     (L) 09/22/2022     BMP:   Lab Results   Component Value Date     (L) 09/22/2022    POTASSIUM 5.3 09/22/2022    CHLORIDE 101 09/22/2022    CO2 22 09/22/2022    BUN 50.3 (H) 09/22/2022    CR 1.09 09/22/2022     (H) 09/22/2022     COAGS:   Lab Results   Component Value Date    INR 2.30 (H) 09/23/2022     POC: No results found for: BGM, HCG, HCGS  HEPATIC:   Lab Results   Component Value Date    ALBUMIN 3.4 (L) 09/22/2022    PROTTOTAL 5.5 (L) 09/22/2022    ALT 33 09/22/2022    AST 35 09/22/2022    ALKPHOS 56 09/22/2022    BILITOTAL 0.6 09/22/2022     OTHER:   Lab Results   Component Value Date    MAIDA 7.3 (L) 09/22/2022       Anesthesia Plan    ASA Status:  4   NPO Status:  NPO Appropriate    Anesthesia Type: General.   Induction: Intravenous.   Maintenance: TIVA.        Consents    Anesthesia Plan(s) and associated risks, benefits, and realistic alternatives discussed. Questions answered and patient/representative(s) expressed understanding.     - Discussed: Risks, Benefits and Alternatives for BOTH SEDATION and the PROCEDURE were discussed     - Discussed with:  Patient         Postoperative Care            Comments:                RORY Roche CRNA

## 2022-09-23 NOTE — ANESTHESIA POSTPROCEDURE EVALUATION
Patient: Shaka Aguirre    Procedure: Procedure(s):  ESOPHAGOGASTRODUODENOSCOPY, WITH BIOPSY       Anesthesia Type:  General    Note:  Disposition: Inpatient   Postop Pain Control: Uneventful            Sign Out: Well controlled pain   PONV: No   Neuro/Psych: Uneventful            Sign Out: Acceptable/Baseline neuro status   Airway/Respiratory: Uneventful            Sign Out: Acceptable/Baseline resp. status   CV/Hemodynamics: Uneventful            Sign Out: Acceptable CV status; No obvious hypovolemia; No obvious fluid overload   Other NRE: NONE   DID A NON-ROUTINE EVENT OCCUR? No           Last vitals:  Vitals:    09/23/22 1015 09/23/22 1030 09/23/22 1212   BP: 120/57 111/60 128/67   Pulse: 88 88 60   Resp: 18 18 16   Temp: 36.8  C (98.2  F) 36.6  C (97.8  F) 36.9  C (98.5  F)   SpO2: 99% 99% 98%       Electronically Signed By: RORY Roche CRNA  September 23, 2022  12:36 PM

## 2022-09-23 NOTE — PROGRESS NOTES
Skin affirmation note      Admitting nurse completed full skin assessment, Smith score and Smith interventions. This writer agrees with the initial skin assessment findings.

## 2022-09-23 NOTE — PLAN OF CARE
Time: Assumed care of patient from 0040-0700      Reason for Admission: GI Bleed      Activity: SBA w/gait belt      Neuro: A/O x 4      GI/:  Continent of bowel and bladder, last BM 9/22      Skin: Scattered bruising      Diet: NPO      Lines/Drains: PIV is saline locked      Vitals: VSS on room air      Pain: Denies      Plan: Monitoring hgb; surgical consult for endoscopy and possible colonoscopy

## 2022-09-23 NOTE — PROGRESS NOTES
Patient sent down to same day surgery center at 1100. Blood still transfusing per request when giving report.

## 2022-09-23 NOTE — PLAN OF CARE
[place] [facility] [place]    Patient admitted to room 2304 at approximately 0040 via cart from emergency room. Patient was accompanied by transport tech.     Verbal SBAR report received from Fernanda prior to patient arrival.     Patient ambulated to bed with stand-by assist. Patient alert and oriented X 4. The patient is not having any pain.  . Admission vital signs: Blood pressure 127/55, pulse 66, temperature 97.9  F (36.6  C), temperature source Oral, resp. rate 18, height 1.575 m (5' 2\"), weight 63.7 kg (140 lb 6.9 oz), SpO2 97 %. Patient was oriented to plan of care, call light, bed controls, tv, telephone, bathroom and visiting hours.     Risk Assessment    The following safety risks were identified during admission: fall. Yellow risk band applied: YES.     Skin Initial Assessment    This writer admitted this patient and completed a full skin assessment and Smith score in the Adult PCS flowsheet. Appropriate interventions initiated as needed.     Secondary skin check completed by Celena Mtz Risk Assessment  Sensory Perception: 4-->no impairment  Moisture: 4-->rarely moist  Activity: 3-->walks occasionally  Mobility: 4-->no limitation  Nutrition: 3-->adequate  Friction and Shear: 3-->no apparent problem  Smith Score: 21  Mattress: Standard Hospital Mattress (Foam)  Bed Frame: Standard width and length    Education    Patient has a [place] to Observation order: No  Observation education completed and documented: N/A      Yane See RN      "

## 2022-09-23 NOTE — PLAN OF CARE
Blood transfusion given this morning per order, sent blood infusing with patient to procedure this afternoon. Patient alert and orientated x4 after procedure but tired. Wife here to visit. Diet advanced, patient ate dinner. Complaining of a sore throat from procedure popsicle given and patient reported it helped. Patient transfers stand by assist with walker.

## 2022-09-23 NOTE — ED TRIAGE NOTES
Black stools for past 2 days.  C/o soa.  No cp. Pt on coumadin and  INR elevated at 9.3 according to pt.     Triage Assessment     Row Name 09/22/22 2126       Triage Assessment (Adult)    Airway WDL WDL       Respiratory WDL    Respiratory WDL WDL       Skin Circulation/Temperature WDL    Skin Circulation/Temperature WDL WDL       Cardiac WDL    Cardiac WDL WDL       Peripheral/Neurovascular WDL    Peripheral Neurovascular WDL WDL       Cognitive/Neuro/Behavioral WDL    Cognitive/Neuro/Behavioral WDL WDL

## 2022-09-24 VITALS
BODY MASS INDEX: 25.48 KG/M2 | HEART RATE: 60 BPM | DIASTOLIC BLOOD PRESSURE: 58 MMHG | OXYGEN SATURATION: 97 % | RESPIRATION RATE: 16 BRPM | SYSTOLIC BLOOD PRESSURE: 112 MMHG | HEIGHT: 62 IN | WEIGHT: 138.45 LBS | TEMPERATURE: 97.6 F

## 2022-09-24 LAB
ANION GAP SERPL CALCULATED.3IONS-SCNC: 9 MMOL/L (ref 7–15)
BUN SERPL-MCNC: 31.4 MG/DL (ref 8–23)
CALCIUM SERPL-MCNC: 8 MG/DL (ref 8.8–10.2)
CHLORIDE SERPL-SCNC: 104 MMOL/L (ref 98–107)
CREAT SERPL-MCNC: 0.8 MG/DL (ref 0.67–1.17)
DEPRECATED HCO3 PLAS-SCNC: 25 MMOL/L (ref 22–29)
ERYTHROCYTE [DISTWIDTH] IN BLOOD BY AUTOMATED COUNT: 17.4 % (ref 10–15)
GFR SERPL CREATININE-BSD FRML MDRD: 88 ML/MIN/1.73M2
GLUCOSE SERPL-MCNC: 113 MG/DL (ref 70–99)
HCT VFR BLD AUTO: 25.6 % (ref 40–53)
HGB BLD-MCNC: 8.4 G/DL (ref 13.3–17.7)
INR PPP: 1.22 (ref 0.85–1.15)
MCH RBC QN AUTO: 32.8 PG (ref 26.5–33)
MCHC RBC AUTO-ENTMCNC: 32.8 G/DL (ref 31.5–36.5)
MCV RBC AUTO: 100 FL (ref 78–100)
PLATELET # BLD AUTO: 96 10E3/UL (ref 150–450)
POTASSIUM SERPL-SCNC: 4.4 MMOL/L (ref 3.4–5.3)
RBC # BLD AUTO: 2.56 10E6/UL (ref 4.4–5.9)
SODIUM SERPL-SCNC: 138 MMOL/L (ref 136–145)
WBC # BLD AUTO: 4.8 10E3/UL (ref 4–11)

## 2022-09-24 PROCEDURE — 250N000011 HC RX IP 250 OP 636: Performed by: SURGERY

## 2022-09-24 PROCEDURE — 36415 COLL VENOUS BLD VENIPUNCTURE: CPT | Performed by: SURGERY

## 2022-09-24 PROCEDURE — 250N000013 HC RX MED GY IP 250 OP 250 PS 637: Performed by: SURGERY

## 2022-09-24 PROCEDURE — 85610 PROTHROMBIN TIME: CPT | Performed by: SURGERY

## 2022-09-24 PROCEDURE — 85027 COMPLETE CBC AUTOMATED: CPT | Performed by: SURGERY

## 2022-09-24 PROCEDURE — 80048 BASIC METABOLIC PNL TOTAL CA: CPT | Performed by: SURGERY

## 2022-09-24 PROCEDURE — C9113 INJ PANTOPRAZOLE SODIUM, VIA: HCPCS | Performed by: SURGERY

## 2022-09-24 PROCEDURE — 99239 HOSP IP/OBS DSCHRG MGMT >30: CPT

## 2022-09-24 RX ORDER — PANTOPRAZOLE SODIUM 40 MG/1
40 TABLET, DELAYED RELEASE ORAL DAILY
Qty: 60 TABLET | Refills: 0 | Status: SHIPPED | OUTPATIENT
Start: 2022-09-24 | End: 2024-01-16

## 2022-09-24 RX ADMIN — PANTOPRAZOLE SODIUM 40 MG: 40 INJECTION, POWDER, FOR SOLUTION INTRAVENOUS at 00:29

## 2022-09-24 RX ADMIN — LEVOTHYROXINE SODIUM 88 MCG: 0.09 TABLET ORAL at 06:18

## 2022-09-24 RX ADMIN — FERROUS SULFATE TAB 325 MG (65 MG ELEMENTAL FE) 325 MG: 325 (65 FE) TAB at 11:18

## 2022-09-24 RX ADMIN — AMIODARONE HYDROCHLORIDE 100 MG: 100 TABLET ORAL at 07:34

## 2022-09-24 RX ADMIN — ATORVASTATIN CALCIUM 80 MG: 80 TABLET, FILM COATED ORAL at 07:34

## 2022-09-24 RX ADMIN — FLUOXETINE 20 MG: 20 CAPSULE ORAL at 07:34

## 2022-09-24 RX ADMIN — PANTOPRAZOLE SODIUM 40 MG: 40 INJECTION, POWDER, FOR SOLUTION INTRAVENOUS at 11:18

## 2022-09-24 RX ADMIN — METOPROLOL TARTRATE 12.5 MG: 25 TABLET, FILM COATED ORAL at 07:34

## 2022-09-24 ASSESSMENT — ACTIVITIES OF DAILY LIVING (ADL)
ADLS_ACUITY_SCORE: 24

## 2022-09-24 NOTE — PLAN OF CARE
Hemoglobin drawn last evening was 8.5.  No further bleeding.    Calls with needs, up to bathroom with SBA and gait belt.  Did walk in hallway last evening.  VSS.

## 2022-09-24 NOTE — DISCHARGE SUMMARY
[facility]    Discharge Summary  Hospital Medicine    Date of Admission:  9/22/2022  Date of Discharge:  9/24/2022   Discharging Provider: Adrien Stevenson MD  Date of Service: 9/24/2022      Primary Care     [facility]  [facility] [address] [address]      Identification and Chief Compaint:  Shaka Aguirre is a 82 year old male admitted on 9/22/2022. He has history of paroxysmal atrial fibrillation S/P BiV-ICD and on warfarin, systolic heart failure, bioprosthetic aortic and mitral valves, coronary artery disease status post CABG, hypertension, hypothyroidism, depression with anxiety, and mild dementia.  He presented due to supratherapeutic INR and melena over the previous 2 days.     Discharge Diagnoses     Gastrointestinal hemorrhage due to prepyloric gastritis  Acute anemia due to gastrointestinal loss superimposed on chronic anemia  Iron deficiency  Supratherapeutic INR  Acquired coagulopathy secondary to warfarin use  Paroxysmal atrial fibrillation  Status post biventricular ICD placed  Chronic systolic heart failure  Status post bioprosthetic aortic and mitral valve replacement  History of rheumatic heart disease  Chronic dyspnea on exertion  History of coronary artery disease  Hypertension  Chronic thrombocytopenia  Hypothyroidism  Depression with anxiety  Mild dementia    Discharge Disposition   Discharged to home    Discharge Orders      Reason for your hospital stay    You are having problems with weakness and dizziness when you are upright, as well as problems with an INR value that was too high at home, so you came into the emergency department for evaluation.  In the emergency department, you were found to be significantly anemic, with a hemoglobin of 7.0.  You received a 1 unit blood transfusion, which improved your hemoglobin to 8.4.  You were seen in consultation by Dr. Rodriguez, who performed upper endoscopy on you.   "That endoscopy found you to have evidence of gastritis (irritation of the lining of the stomach) with evidence of recent bleeding.  However, no active bleeding was seen.  Your hemoglobin has been stable for the past 24 hours, and you feel well.  Therefore, you can go home today.    A few things to note:  1.  Your ibuprofen use was probably a big contributor to the development of gastritis and bleeding from your stomach.  Please avoid anything like Motrin, ibuprofen, naproxen, Aleve, etc.  All of these medications are in the class called \"nonsteroidal anti-inflammatory drugs\".  If you have any concern that a medication you might want to take is in that class, please ask your pharmacist.  2.  Due to recent bleeding, you will need to spend some time off of warfarin.  You can resume warfarin in about 2 weeks, which will be around October 8.  However, you will need to make sure that your hemoglobin is stable before resuming warfarin.  Please keep in touch with your primary care provider about this.  3.  I have prescribed pantoprazole, the medication that we discussed that decreases stomach acid production, and will allow time for your stomach lining to heal.  You should take pantoprazole for 2 months, after which you can stop it.  I hope that it is not too expensive for you.     Follow-up and recommended labs and tests     Follow up with primary care provider, Issac Joshi, within 7 days for hospital follow- up.  The following labs/tests are recommended: 1.  The gastric biopsy for H. pylori should be available by the time you see the patient in follow-up.  2.  Please consider repeat hemoglobin.     Activity    Your activity upon discharge: activity as tolerated.     Full Code     Diet    Follow this diet upon discharge: Orders Placed This Encounter      Regular Diet Adult        Discharge Medications   Current Discharge Medication List      CONTINUE these medications which have CHANGED    Details   pantoprazole " (PROTONIX) 40 MG EC tablet Take 1 tablet (40 mg) by mouth daily  Qty: 60 tablet, Refills: 0    Associated Diagnoses: Gastrointestinal hemorrhage, unspecified gastrointestinal hemorrhage type         CONTINUE these medications which have NOT CHANGED    Details   alendronate (FOSAMAX) 70 MG tablet Take 70 mg by mouth once a week      atorvastatin (LIPITOR) 80 MG tablet Take 80 mg by mouth daily      EUTHYROX 88 MCG tablet Take 88 mcg by mouth daily      ferrous sulfate (FEROSUL) 325 (65 Fe) MG tablet Take 325 mg by mouth 2 times daily      FLUoxetine (PROZAC) 20 MG capsule Take 20 mg by mouth daily      furosemide (LASIX) 80 MG tablet Take 80 mg by mouth 2 times daily      metoprolol succinate ER (TOPROL XL) 25 MG 24 hr tablet Take 25 mg by mouth daily      PACERONE 100 MG TABS tablet Take 100 mg by mouth daily      spironolactone (ALDACTONE) 25 MG tablet Take 12.5 mg by mouth      tiZANidine (ZANAFLEX) 2 MG tablet Take 2 mg by mouth At Bedtime         STOP taking these medications       warfarin (COUMADIN) 3 MG tablet Comments:   Reason for Stopping:             Allergies   Allergies   Allergen Reactions     Pollen Extract        Consultations This Hospital Stay    SURGERY GENERAL IP CONSULT  CARE MANAGEMENT / SOCIAL WORK IP CONSULT    Significant Results and Procedures     Upper GI Endoscopy 09/23/2022 11:59 AM [facility]   _______________________________________________________________________________   Patient Name: Shaka Middletongautam      Procedure Date: 9/23/2022 11:59 AM   MRN: [id number]                       YOB: 1940   Admit Type: Inpatient                 Age: 82   Gender: Male                          Attending MD: DULCE BOATENG MD   Total Sedation Time:                     _______________________________________________________________________________       Procedure:             Upper GI endoscopy   Indications:           Acute post hemorrhagic anemia, Melena   Providers:              DULCE BOATENG MD   Referring MD:             Medicines:             Monitored Anesthesia Care   Complications:         No immediate complications.   _______________________________________________________________________________   Procedure:             Pre-Anesthesia Assessment:                          - Prior to the procedure, a History and Physical was                          performed, and patient medications, allergies and                          sensitivities were reviewed. The patient's tolerance                          of previous anesthesia was reviewed.                          - The risks and benefits of the procedure and the                          sedation options and risks were discussed with the                          patient. All questions were answered and informed                          consent was obtained.                          - Patient identification and proposed procedure were                          verified prior to the procedure by the physician, the                          nurse and the anesthetist. The procedure was verified                          in the pre-procedure area in the endoscopy suite.                          After obtaining informed consent, the endoscope was                          passed under direct vision. Throughout the procedure,                          the patient's blood pressure, pulse, and oxygen                          saturations were monitored continuously. The Endoscope                          was introduced through the mouth, and advanced to the                          second part of duodenum. The upper GI endoscopy was                          accomplished without difficulty. The patient tolerated                          the procedure well.                                                                                     Findings:        The examined duodenum was normal.        Localized mild inflammation characterized by  erythema and shallow        ulcerations was found in the prepyloric region of the stomach. Biopsies        were taken with a cold forceps for Helicobacter pylori testing.        Verification of patient identification for the specimen was done by the        nurse and technician using the patient's name and birth date. Estimated        blood loss was minimal. There was a small amount of coffee ground        material in the gastric lumen        The gastroesophageal flap valve was visualized endoscopically and        classified as Hill Grade IV (no fold, wide open lumen, hiatal hernia        present).        A 1 cm hiatal hernia was present.        The Z-line was variable and was found 39 cm from the incisors.                                                                                     Impression:            - Normal examined duodenum.                          - Gastritis. Biopsied.                          - Gastroesophageal flap valve classified as Hill Grade                          IV (no fold, wide open lumen, hiatal hernia present).                          - 1 cm hiatal hernia.                          - Z-line variable, 39 cm from the incisors.   Recommendation:        - Return patient to [facility] for [facility] care.                          - Clear liquid diet.                          - Continue present medications.                          - No ibuprofen, naproxen, or other non-steroidal                          anti-inflammatory drugs.                                                                                      Signed Electronically by Dulce Boateng MD   __________________________   DULCE BOATENG MD   9/23/2022 12:29:50 PM          Data   Results for orders placed or performed during the hospital encounter of 01/18/19   US Abdomen Complete    Narrative    US ABDOMEN COMPLETE 1/18/2019 8:18 AM    HISTORY: Iron deficiency anemia.    TECHNIQUE: Routine assessed structures include  the gallbladder, bile  ducts, liver, pancreas, kidneys, and spleen size. Also assessed are  the abdominal aorta and hepatic portion of the IVC.    COMPARISON: None.    FINDINGS: The liver is within normal limits in size and echogenicity.  No focal liver lesion is seen. No gallstones, gallbladder wall  thickening, or biliary dilatation are demonstrated. The pancreas is  mostly obscured by overlying bowel gas. The kidneys are within normal  limits. The spleen is not enlarged. The abdominal aorta is not  aneurysmal.        Impression    IMPRESSION:  1.  No pathological finding is seen within the abdomen.  2. Incidentally on a few images a trace right pleural effusion is  questioned.        CHIDI HAM MD       History of Present Illness   (From H&P) Shaka Aguirre is a 82 year old male who presents due to concerns of supratherapeutic INR and progressive shortness of breath.     He states over the past several months he has had progressive shortness of breath, particularly with activity as well as fatigue.  Today he went for an INR check and it was found to be elevated.  The patient reports it was at 9 though review of chart shows value of 11.2.  He has noticed black stools over the past 2 days.  He reports having 1 formed bowel movement daily.  He believes about 4 or 5 days ago his stools were brown.  He denies any bright red blood per rectum.  He is uncertain if he has had a history of this before though I see he has had EGD, colonoscopy and pill study in the setting of anemia previously.  Unclear if he had melena at that time.  No etiology was found for anemia per review of PCP note.     He feels in the past week his shortness of breath has been slightly worse as has his fatigue.  When he stands up he feels lightheaded at times, he feels this may be worse in the past week as well.  He has noticed increasing lower extremity edema over the past month.  He has been taking increased doses of his furosemide and  initially had good decrease in his weight though it has started going back up.  He otherwise denies fevers, chills, headache, URI symptoms, chest pains, palpitations, abdominal pain, nausea, vomiting, diarrhea, urinary changes.    Hospital Course     Gastrointestinal hemorrhage secondary to prepyloric gastritis  Patient who takes warfarin for stroke prevention and atrial fibrillation as well as ibuprofen twice daily presented with 2 days of melena, 1 week of increased fatigue, shortness of breath and intermittent lightheadedness when standing up. On presentation Hgb 7.8, INR > 10.0.  Received 10 mg IV vitamin K 10 mg IV in ED along with pantoprazole.   He was seen in consultation by Dr. Rodriguez, general surgery.  Dr. Rodriguez performed EGD 9/23/2022.  Procedure note is included above, though in summary the primary finding was that of gastritis in the prepyloric area, with some coffee ground material near the pylorus.  That area was biopsied; pathology is pending at the time of discharge.  Dr. Rodriguez's recommendation was for the patient to avoid all NSAID use.  Today, the patient feels well.  His hemoglobin is stable, discussed in more detail below.  He is stable for discharge.  -The patient had been on pantoprazole starting in 2018.  I found a chart note that indicated that in 2019 he stopped taking it because of a high co-pay.  I explained the indication to resume pantoprazole for period of 2 months given the finding of gastritis on EGD.  The patient agrees to treatment.  I will send in a new prescription for a 2-month supply.  -Ibuprofen and other NSAIDs will need to be discontinued.  I discussed this with the patient's and his wife.  They expressed understanding.  -Warfarin remains on hold.  I suggested to the patient that he wait 2 weeks, until approximately 10/8/2022, to resume warfarin.  -The gastric biopsy result for H. pylori can be reviewed at PCP follow-up.  -See management of anemia below.     Acute  "anemia due to gastrointestinal loss superimposed on chronic anemia  Iron Deficiency  On presentation Hgb 7.8 with concerns of upper GI bleed as above. Most recent Hgb 10.0 on 8/23/2022, previous baseline Hgb appears to have been around 12. Recent B12 and folate normal. History of iron deficiency managed on ferrous sulfate twice daily. Iron studies normal on admission here. Per PCP note history of EGD, colonoscopy and PillCam were unrevealing (able to review EGD/colonoscopy from 2018).      Hgb trend: 7.8 --> 7.0 --> 7.1 --> transfused 1 unit(s) PRBC --> 8.5 --> 8.4.    His complaints on admission of global weakness and dizziness when up and around have improved slightly following transfusion.     -See management of GI bleed above.  -Continue preadmission ferrous sulfate.  -Monitor hemoglobin as an outpatient.     Supratherapeutic INR, history of labile INR  Acquired coagulopathy secondary to warfarin use  INR > 10.0 on presentation, 11.2 per outside lab. History of labile INR, up to 9.6 on 8/5/2022.  Received vitamin K 10 mg IV x1 in ED 9/22/2022.  INR the next morning was 2.30.  -Because of this acute GI bleed, I advised the patient that he should wait approximately 2 weeks prior to resuming warfarin.  That would make his resumption date approximately 10/8/2022.  If hemoglobin is not stable by that time, it is possible that warfarin will need to remain on hold for a prolonged period of time or indefinitely.     Paroxsymal Atrial Fibrillation  S/P BiV ICD  Managed with amiodarone as well as warfarin for stroke risk reduction though has had issues with easy bruising and labile INRs. Interested in Watchman, discussed at last cardiology visit.  The patient tells me that cardiology wanted to \"hold off\" on Watchman at least for the present.    -Continue preadmission amiodarone.  -Warfarin will remain on hold for period of at least 2 weeks, until hemoglobin is confirmed stable after this episode of GI bleeding.  If there " "is evidence of ongoing blood loss, it is possible that warfarin may not be able to be resumed.  If that is the case, perhaps that would tilt the risk/benefit ratio more in favor of the Watchman procedure.     Chronic Systolic Heart Failure   Bioprosthetic aortic and mitral valve replacements  History of rheumatic heart disease  Last echocardiogram on 9/2020 demonstrated an EF of 35-40% and regional wall motion abnormalities, decreased RV systolic function, bovine aortic and mitral valves, not significantly changed by comparison to a 2018 study, see report for full details.  Recent concern for CHF exacerbation, furosemide increased from 80 mg daily to twice daily.  The patient reports that his weight decreased about 12-13 pounds since that dose change.  There was no evidence of decompensated CHF this hospital stay.  -Resume preadmission furosemide and spironolactone.  -He is not currently on ACEi or beta blocker.   -Continue close cardiology follow-up.     Dyspnea on exertion  Currently under evaluation by cardiology as well as pulmonology.  There was some concern for CHF exacerbation and he underwent diuresis with about a 12-13 pound weight loss.  Pulmonology is currently evaluated for possible fibrosis and notes he has been on amiodarone since at least 2016.  His chief complaint is that of \"congestion\", by which I think he means a persistent productive cough.  He has no oxygen requirement here.  Lung exam today is normal.  -Continue outpatient evaluation.     Coronary Artery Disease  History of single vessel CABG (LIMA to LAD).  Reports no recent exertional chest pain.  -Continue preadmission atorvastatin and metoprolol.     Hypertension  Managed prior to admission with furosemide 80 mg twice daily, spironolactone 12.5 mg daily, and metoprolol ER 25 mg daily.  Diuretics were held here out of concern for active GI bleed.  His metoprolol was changed to the tartrate form to make it more easily titrated, currently at a " dose of 12.5 mg twice daily.  Blood pressure has been normal since admission.  -Continue preadmission furosemide, spironolactone, and metoprolol.     Chronic Thrombocytopenia  Recent baseline 115-187.  Platelets 135, 96 today on admission.  -Follow platelets.     Hypothyroidism  TSH was elevated at 7.37 on 8/16/2022, though free T4 was normal on that date.  -Continue home levothyroxine.     Depression with anxiety  Chronic.  Reports stable recent mood.  -Continue home fluoxetine.     Mild Dementia  MOCA was 27/30 in 2018.  He was evaluated by neuropsych 5/2022 per review of chart, though the note from that evaluation is not viewable.  I do not perceive any significant dementia upon my visits with him this hospital stay.  -No therapy.    Pending Results   Unresulted Labs Ordered in the Past 30 Days of this Admission     Date and Time Order Name Status Description    9/23/2022 12:21 PM Surgical Pathology Exam In process           Physical Exam   Temp:  [97.5  F (36.4  C)-98.3  F (36.8  C)] 97.6  F (36.4  C)  Pulse:  [55-62] 60  Resp:  [16-18] 16  BP: (111-130)/(57-71) 112/58  SpO2:  [95 %-99 %] 97 %  Vitals:    09/22/22 2128 09/23/22 0043 09/24/22 0700   Weight: 62.1 kg (137 lb) 63.7 kg (140 lb 6.9 oz) 62.8 kg (138 lb 7.2 oz)       GENERAL: Pleasant man, who was changing into his street close when he entered his room.  He looks well.  RESP: No accessory muscle use.  Lungs clear throughout on inspiration and expiration.  Expiration not prolonged, no wheeze.  CV: Regular rate and rhythm, non-tachycardic.  Normal S1 S2, grade I/VI holosystolic murmur heard best at the upper sternal borders and at the apex, no extra sound.  No lower extremity edema.  ABDOMEN: Soft, non-tender, no guarding.  Bowel sounds positive.  NEURO: Alert, oriented, conversant.  Cranial nerves III - XII grossly intact.  No gross motor or sensory deficits.    PSYCH: Calm, alert, conversant.  Able to articulate logical thoughts, no tangential thoughts,  no hallucinations or delusions.  Affect normal.    The discharge plan was discussed with the patient and his wife, who was present for this visit.    Total time on this discharge was 35 minutes.    Adrien Stevenson MD   [facility]

## 2022-09-24 NOTE — PLAN OF CARE
[facility] DISCHARGE NOTE    Patient discharged to home at 2:46 PM via wheel chair. Accompanied by spouse and staff. Discharge instructions reviewed with patient and spouse, opportunity offered to ask questions. Prescriptions sent to patients preferred pharmacy. All belongings sent with patient. Patient happy with care received here !    Jeanine Lares RN

## 2022-09-26 ENCOUNTER — TELEPHONE (OUTPATIENT)
Dept: SURGERY | Facility: CLINIC | Age: 82
End: 2022-09-26

## 2022-09-26 NOTE — TELEPHONE ENCOUNTER
Called patient. LM for callback   Would like to hear how patient is doing. Patient should also have follow-up appointment with PCP. Please advise him to schedule if he has not done so already.     Masood RAHMAN RN  [facility]

## 2022-09-26 NOTE — TELEPHONE ENCOUNTER
Pediatric Pulmonary Medicine Return Visit     Name: Jacqueline Epps                        YOB: 2023      Patient ID: [id number]           Age: 8 month old  Date of Service:  2022                       Jacqueline was last seen 8/12/22. 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   Will start weaning diuretic dosing  Encouraged EI/speech therapy.      PLAN/PATIENT INSTRUCTIONS: Discussed with family with [place] .  1. Start to decrease diuril at home - Decrease by 0.1 ml every day until off (1.6ml twice daily today, 1.5ml twice daily tomorrow...)   2. Continue NaCl 1ml via NG tube twice daily.  Decrease NaCl to once daily on 8/14/22.  Then stop NaCl on 8/21/22.   3. Call our office if she develops faster breathing, difficulty breathing or other changes while decreasing medications to discuss any changes in dose  4. Continue flovent 110mcg 1 puff twice daily.  5. Continue NG feeds  6. Will send message to PCP to request EI referral  7. Followup with all other medical providers, early intervention for developmental therapies, including speech therapy.  8. Return to clinic in 1-2 months.      Seen in clinic today with mother.   Visit completed in conjunction with video [place] .     Mother reports Courtney Ortiz has been doing well.   No new respiratory illnesses.  No respiratory concerns.   Has tolerated wean off diuril as planned after last visit.  Also off NaCl as planned.  Remains on flovent 110mcg 1 puff twice daily.   No cough, no noisy breathing. No faster breathing or labored breathing.     Continues to feed via NG tube for majority of feeds.   Remains in EI for developmental therapies, but not receiving speech therapy.   Most recently saw speech at NICU followup.  Per documentation, signs of oral aversion. Recommend EI to trial tastes of purees and thin liquids via spoon.     NICU: 28 1/7 week GA, did not receive surfactant, not  Reason for Call:  Other call back     Detailed comments: waiting on lab results      Phone Number Patient can be reached at: Home number on file [phone] (home)    Best Time: any    Can we leave a detailed message on this number? YES    Call taken on 9/26/2022 at 10:13 AM by Libra Okeefe     intubated. Discharged home 22 on room air, NG only feeds, diuril.      VFSS 22 - aspiration thin and thick liquids      REVIEW OF SYSTEMS:   A complete ROS was performed. Pertinent findings as documented in HPI.  All other systems are negative.     PAST MEDICAL HISTORY:   Past Medical History:   Diagnosis Date   • Hyperbilirubinemia of prematurity 2022   • Observation and evaluation of  for suspected infectious condition ruled out 2022     SURGICAL HISTORY:   Past Surgical History:   Procedure Laterality Date   • No past surgeries         FAMILY HISTORY:  No known asthma  family history is not on file.    SOCIAL HISTORY:   Social History     Social History Narrative    Lives with parents.    No .    No pets    No second hand smoke exposure       ALLERGIES: ALLERGIES:  Patient has no known allergies.     IMMUNIZATIONS:   Immunization History   Administered Date(s) Administered   • COVID-19 6M-5Y Moderna 2022   • DTaP/Hep B/IPV 2022, 2022, 2022   • Hib (PRP-OMP) 2022, 2022   • Pneumococcal Conjugate 13 Valent Vacc (Prevnar 13) 2022, 2022, 2022   Deferred Date(s) Deferred   • Hep B, adolescent or pediatric 2022        PHYSICAL EXAM  Visit Vitals  Pulse 124   Ht 25.2\" (64 cm)   Wt 7.53 kg (16 lb 9.6 oz)   SpO2 100%   BMI 18.38 kg/m²     Growth percentiles: 1 %ile (Z= -2.27) based on WHO (Girls, 0-2 years) Length-for-age data based on Length recorded on 2022. 28 %ile (Z= -0.58) based on WHO (Girls, 0-2 years) weight-for-age data using vitals from 2022. Body mass index is 18.38 kg/m².  Physical Exam  Vitals and nursing note reviewed.   Constitutional:       General: She is active. She is not in acute distress.  HENT:      Head: Anterior fontanelle is flat.      Right Ear: External ear normal.      Left Ear: External ear normal.      Nose: Nose normal.      Comments: NG tube in place     Mouth/Throat:      Mouth: Mucous  membranes are moist.      Pharynx: Oropharynx is clear.   Eyes:      Conjunctiva/sclera: Conjunctivae normal.   Cardiovascular:      Rate and Rhythm: Normal rate and regular rhythm.      Heart sounds: S1 normal and S2 normal. No murmur heard.  Pulmonary:      Effort: Pulmonary effort is normal. No respiratory distress or retractions.      Breath sounds: Normal breath sounds. No stridor. No wheezing, rhonchi or rales.      Comments: Symmetric chest wall. Mild intermittent tachypnea. No use of accessory muscles of respiration. Moderate-good aeration equal bilaterally.    Abdominal:      General: There is no distension.      Palpations: Abdomen is soft.      Tenderness: There is no abdominal tenderness.   Musculoskeletal:         General: No deformity.      Cervical back: Neck supple.      Comments: No clubbing   Lymphadenopathy:      Cervical: No cervical adenopathy.   Skin:     General: Skin is warm and dry.      Findings: Rash: dry patches of skin.   Neurological:      Mental Status: She is alert.      Motor: No abnormal muscle tone.         Current Outpatient Medications   Medication Sig Dispense Refill   • chlorothiazide, DIURIL, 50 mg/mL suspension Take 1.6 mLs by mouth 2 times daily. 100 mL 3   • pediatric multivitamin (POLY-VI-SOL) solution Take 1 mL by mouth every 24 hours. 50 mL 12   • fluticasone propionate (FLOVENT HFA) 110 MCG/ACT inhaler Inhale 1 puff into the lungs two times daily. 12 g 12     No current facility-administered medications for this visit.         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 Needs speech therapy.      PLAN/PATIENT INSTRUCTIONS:   1. Decrease flovent dosing to  110mcg 1 puff once daily  2. Continue NG feeds.  3. Will send message to primary care doctor and speech therapy to facilitate speech therapy evaluation and sessions to assess oral feedings.   4. Call our office if she develops faster breathing, difficulty breathing or other changes while decreasing medications to discuss any changes in dose  5. Followup with all other medical providers, early intervention for developmental therapies, including speech therapy.  6. Return to clinic in 2-3 months. Call sooner with questions, concerns. [phone].

## 2022-09-27 ENCOUNTER — PATIENT OUTREACH (OUTPATIENT)
Dept: CARE COORDINATION | Facility: CLINIC | Age: 82
End: 2022-09-27

## 2022-09-27 NOTE — PROGRESS NOTES
[facility] Resource Center Contact  [facility]/Voicemail     Clinical Data: Transitional Care Management Outreach     Outreach attempted x 2.  Left message on patient's voicemail, providing [facility]'s 24/7 scheduling and nurse triage phone number [phone] ([phone]) for questions/concerns and/or to schedule an appt with an [facility] provider, if they do not have a PCP.      Plan:  [facility] will do no further outreaches at this time.     LADAN Mcwilliams  [phone]  [facility]    *Connected Care Resource Team does NOT follow patient ongoing. Referrals are identified based on internal discharge reports and the outreach is to ensure patient has an understanding of their discharge instructions.

## 2022-09-28 LAB
PATH REPORT.COMMENTS IMP SPEC: NORMAL
PATH REPORT.COMMENTS IMP SPEC: NORMAL
PATH REPORT.FINAL DX SPEC: NORMAL
PATH REPORT.GROSS SPEC: NORMAL
PATH REPORT.MICROSCOPIC SPEC OTHER STN: NORMAL
PATH REPORT.MICROSCOPIC SPEC OTHER STN: NORMAL
PATH REPORT.RELEVANT HX SPEC: NORMAL
PHOTO IMAGE: NORMAL

## 2022-10-04 NOTE — TELEPHONE ENCOUNTER
Reached patient.   Has INR recheck tomorrow.   Is making appointment with PCP today for Hgb recheck    Masood RAHMAN RN  Specialty Clinics

## 2022-12-08 ENCOUNTER — HOSPITAL ENCOUNTER (OUTPATIENT)
Dept: CT IMAGING | Facility: HOSPITAL | Age: 82
Discharge: HOME | End: 2022-12-08
Attending: ORTHOPAEDIC SURGERY
Payer: COMMERCIAL

## 2022-12-08 DIAGNOSIS — S32.000A LUMBAR COMPRESSION FRACTURE (H): ICD-10-CM

## 2022-12-08 DIAGNOSIS — S22.000A THORACIC COMPRESSION FRACTURE (H): ICD-10-CM

## 2022-12-08 PROCEDURE — 72131 CT LUMBAR SPINE W/O DYE: CPT

## 2022-12-08 PROCEDURE — 72128 CT CHEST SPINE W/O DYE: CPT

## 2022-12-12 ENCOUNTER — HOSPITAL ENCOUNTER (OUTPATIENT)
Dept: NUCLEAR MEDICINE | Facility: HOSPITAL | Age: 82
Discharge: HOME | End: 2022-12-12
Attending: ORTHOPAEDIC SURGERY
Payer: COMMERCIAL

## 2022-12-12 DIAGNOSIS — S32.000A LUMBAR COMPRESSION FRACTURE (H): ICD-10-CM

## 2022-12-12 PROCEDURE — A9503 TC99M MEDRONATE: HCPCS | Performed by: ORTHOPAEDIC SURGERY

## 2022-12-12 PROCEDURE — 343N000001 HC RX 343: Performed by: ORTHOPAEDIC SURGERY

## 2022-12-12 PROCEDURE — 78315 BONE IMAGING 3 PHASE: CPT

## 2022-12-12 RX ORDER — TC 99M MEDRONATE 20 MG/10ML
23-27 INJECTION, POWDER, LYOPHILIZED, FOR SOLUTION INTRAVENOUS ONCE
Status: COMPLETED | OUTPATIENT
Start: 2022-12-12 | End: 2022-12-12

## 2022-12-12 RX ADMIN — TC 99M MEDRONATE 20.6 MCI.: 20 INJECTION, POWDER, LYOPHILIZED, FOR SOLUTION INTRAVENOUS at 09:21

## 2023-01-16 ENCOUNTER — HOSPITAL ENCOUNTER (EMERGENCY)
Facility: CLINIC | Age: 83
Discharge: HOME | End: 2023-01-16
Attending: FAMILY MEDICINE | Admitting: FAMILY MEDICINE
Payer: COMMERCIAL

## 2023-01-16 VITALS
SYSTOLIC BLOOD PRESSURE: 118 MMHG | TEMPERATURE: 97.7 F | HEART RATE: 60 BPM | HEIGHT: 64 IN | OXYGEN SATURATION: 97 % | DIASTOLIC BLOOD PRESSURE: 77 MMHG | RESPIRATION RATE: 21 BRPM | BODY MASS INDEX: 25.27 KG/M2 | WEIGHT: 148 LBS

## 2023-01-16 DIAGNOSIS — K92.1 MELENA: ICD-10-CM

## 2023-01-16 DIAGNOSIS — R79.1 ELEVATED INR: ICD-10-CM

## 2023-01-16 DIAGNOSIS — D64.9 ANEMIA, UNSPECIFIED TYPE: ICD-10-CM

## 2023-01-16 LAB
ALBUMIN SERPL BCG-MCNC: 3.7 G/DL (ref 3.5–5.2)
ALP SERPL-CCNC: 98 U/L (ref 40–129)
ALT SERPL W P-5'-P-CCNC: 28 U/L (ref 10–50)
ANION GAP SERPL CALCULATED.3IONS-SCNC: 10 MMOL/L (ref 7–15)
AST SERPL W P-5'-P-CCNC: 37 U/L (ref 10–50)
BASOPHILS # BLD AUTO: 0 10E3/UL (ref 0–0.2)
BASOPHILS NFR BLD AUTO: 0 %
BILIRUB SERPL-MCNC: 0.4 MG/DL
BUN SERPL-MCNC: 33.3 MG/DL (ref 8–23)
CALCIUM SERPL-MCNC: 8.6 MG/DL (ref 8.8–10.2)
CHLORIDE SERPL-SCNC: 104 MMOL/L (ref 98–107)
CREAT SERPL-MCNC: 0.89 MG/DL (ref 0.67–1.17)
DEPRECATED HCO3 PLAS-SCNC: 25 MMOL/L (ref 22–29)
EOSINOPHIL # BLD AUTO: 0.1 10E3/UL (ref 0–0.7)
EOSINOPHIL NFR BLD AUTO: 2 %
ERYTHROCYTE [DISTWIDTH] IN BLOOD BY AUTOMATED COUNT: 16.1 % (ref 10–15)
GFR SERPL CREATININE-BSD FRML MDRD: 85 ML/MIN/1.73M2
GLUCOSE SERPL-MCNC: 101 MG/DL (ref 70–99)
HCT VFR BLD AUTO: 29.7 % (ref 40–53)
HGB BLD-MCNC: 9.8 G/DL (ref 13.3–17.7)
HOLD SPECIMEN: NORMAL
IMM GRANULOCYTES # BLD: 0 10E3/UL
IMM GRANULOCYTES NFR BLD: 1 %
INR PPP: 4.07 (ref 0.85–1.15)
LYMPHOCYTES # BLD AUTO: 1.1 10E3/UL (ref 0.8–5.3)
LYMPHOCYTES NFR BLD AUTO: 20 %
MCH RBC QN AUTO: 32.6 PG (ref 26.5–33)
MCHC RBC AUTO-ENTMCNC: 33 G/DL (ref 31.5–36.5)
MCV RBC AUTO: 99 FL (ref 78–100)
MONOCYTES # BLD AUTO: 0.7 10E3/UL (ref 0–1.3)
MONOCYTES NFR BLD AUTO: 12 %
NEUTROPHILS # BLD AUTO: 3.5 10E3/UL (ref 1.6–8.3)
NEUTROPHILS NFR BLD AUTO: 65 %
NRBC # BLD AUTO: 0 10E3/UL
NRBC BLD AUTO-RTO: 0 /100
PLATELET # BLD AUTO: 140 10E3/UL (ref 150–450)
POTASSIUM SERPL-SCNC: 4.8 MMOL/L (ref 3.4–5.3)
PROT SERPL-MCNC: 6.4 G/DL (ref 6.4–8.3)
RBC # BLD AUTO: 3.01 10E6/UL (ref 4.4–5.9)
SODIUM SERPL-SCNC: 139 MMOL/L (ref 136–145)
WBC # BLD AUTO: 5.4 10E3/UL (ref 4–11)

## 2023-01-16 PROCEDURE — 99284 EMERGENCY DEPT VISIT MOD MDM: CPT | Performed by: FAMILY MEDICINE

## 2023-01-16 PROCEDURE — 93005 ELECTROCARDIOGRAM TRACING: CPT | Performed by: FAMILY MEDICINE

## 2023-01-16 PROCEDURE — 85610 PROTHROMBIN TIME: CPT | Performed by: FAMILY MEDICINE

## 2023-01-16 PROCEDURE — 36415 COLL VENOUS BLD VENIPUNCTURE: CPT | Performed by: FAMILY MEDICINE

## 2023-01-16 PROCEDURE — 85004 AUTOMATED DIFF WBC COUNT: CPT | Performed by: FAMILY MEDICINE

## 2023-01-16 PROCEDURE — 80053 COMPREHEN METABOLIC PANEL: CPT | Performed by: FAMILY MEDICINE

## 2023-01-16 ASSESSMENT — ACTIVITIES OF DAILY LIVING (ADL): ADLS_ACUITY_SCORE: 33

## 2023-01-16 NOTE — ED TRIAGE NOTES
"Pt states his hgb is low, 7.1.  Pt sent by MD.  Pt has a h/o low hgb, \"sometimes I have black stools\".  No colonoscopy's since low hgb.  Pt also has a \"hole in my heart where they replaced my valve\".  Both are making him very tired and sob.      "

## 2023-01-17 NOTE — DISCHARGE INSTRUCTIONS
RETURN TO THE EMERGENCY ROOM FOR THE FOLLOWING:    Worsened breathing, fainting, increased frequency and volume of black stool, new chest pain, concerning changes in behavior, or at anytime for any concern.    FOLLOW UP:    With your INR clinic as scheduled.    Primary care follow-up later this week for reevaluation.  Call the clinic tomorrow and ask for an urgent 3-5-day follow-up visit from the ED for melena, anemia, elevated INR.    TREATMENT RECOMMENDATIONS:    Hold Coumadin today, tomorrow, and Wednesday.  Take half dose on Thursday or wait for direction from the INR clinic.    NURSE ADVICE LINE:  [phone] or [phone]

## 2023-01-17 NOTE — ED PROVIDER NOTES
"  HPI   The patient is an 82-year-old male presenting with concern for anemia.  He has a known history of anemia thought to be GI hemorrhage related.  He takes Coumadin on a regular basis for atrial fibrillation.  His hemoglobin on 1/13 was 10.5 and his INR was 4.1.  He has mild dementia.  He has thrombocytopenia.  He has a known history of aortic and mitral valve replacement, biosynthetic.  He takes Lasix, amiodarone, spironolactone, Lipitor, Synthroid, and Prozac, among others.  1 glass of wine daily.  Not a smoker.  No drugs of abuse.  He presents by private car with his wife.  They live in a private home.    The patient reports black stool \"almost every day.\"  He did have some black stool this morning mixed with brown stool.  He has been seeing some black stool over the past 2 weeks.  No abdominal pain.  He does have shortness of breath at baseline and it is not worse or better.  No new chest pain.  He does describe new lightheadedness and weakness when he is upright.  He fell 2 weeks ago but not since.  No headache.        Allergies:  Allergies   Allergen Reactions     Pollen Extract      Problem List:    Patient Active Problem List    Diagnosis Date Noted     Gastrointestinal hemorrhage, unspecified gastrointestinal hemorrhage type 09/22/2022     Priority: Medium     Status post aortic valve replacement with bioprosthetic valve 09/22/2022     Priority: Medium     Mild dementia 08/23/2022     Priority: Medium     Formatting of this note might be different from the original.  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       Thrombocytopenia (H) 08/25/2020     Priority: Medium     Generalized osteoarthritis of hand 09/09/2019     Priority: Medium     Chronic systolic congestive heart failure (H) 01/01/2019     Priority: Medium     Biventricular ICD (implantable cardioverter-defibrillator) in place 10/23/2018     Priority: Medium     Overview:   Date of last " device in office evaluation: 7/27/18    ?  and model:   [employer] Inogen G146  BiV ICD   * Date of implant: 12/20/16              ? Indication for device: CHF; cardiomyopathy; AVR,MVR; atrial flutter/fibrillation; ventricular tachycardia; 100% V paced    ? Cardiac resynchronization therapy:  yes  ? Battery longevity documented as less than 3  Months: no  ? Are any of the leads less than 3 months old:no    ? Programming              ? Pacing mode and programmed lower rate: DDDR 60 bpm              ? ICD therapy                     ? Lowest heart rate for shock delivery: 170bpm                     ? Lowest heart rate for ATP delivery:  170bpm    ? Rate-responsive sensor type, if programmed on: accelerometer on       Underlying rhythm and heart rate if it can be determined:  sinus bradycardia with 1st deg AVB @ 51 bpm       * For ICD, will magnet disable detection? yes  ? Will ICD detections resume automatically with removal of  the magnet? yes                                                                        ? Any alert status on CIED generator or lead: no  ? Last pacing threshold    Atrial   1V @ 0.4ms           Ventricular   RV 1V @ 0.4ms      LV 0.9V @ 0.4ms       Anemia 10/18/2018     Priority: Medium     Hyponatremia 10/18/2018     Priority: Medium     Iron deficiency anemia secondary to inadequate dietary iron intake 10/18/2018     Priority: Medium     Formatting of this note might be different from the original.  Negative EGD, colonoscopy, [facility].       Age-related osteoporosis without current pathological fracture 08/09/2018     Priority: Medium     Overview:   DEXA at [place] 6/28/18.  L femur -3.1.       Anticoagulation monitoring, INR range 2-3 02/15/2018     Priority: Medium     Status post mitral valve replacement with bioprosthetic valve 02/15/2018     Priority: Medium     Depression with anxiety 02/15/2018     Priority: Medium     Hypertension 02/15/2018     Priority: Medium      Hypothyroidism (acquired) 02/15/2018     Priority: Medium     MARISELA on CPAP 02/15/2018     Priority: Medium     Paroxysmal atrial fibrillation (H) 02/15/2018     Priority: Medium      Past Medical History:    Past Medical History:   Diagnosis Date     Age-related osteoporosis without current pathological fracture 8/9/2018     Biventricular ICD (implantable cardioverter-defibrillator) in place 10/23/2018     Chronic systolic congestive heart failure (H) 1/1/2019     Depression with anxiety 2/15/2018     Hypertension 2/15/2018     Hypothyroidism (acquired) 2/15/2018     Iron deficiency anemia secondary to inadequate dietary iron intake 10/18/2018     Mild dementia 8/23/2022     MARISELA on CPAP 2/15/2018     Paroxysmal atrial fibrillation (H) 2/15/2018     Status post aortic valve replacement with bioprosthetic valve 9/22/2022     Status post mitral valve replacement with bioprosthetic valve 2/15/2018     Thrombocytopenia (H) 8/25/2020     Past Surgical History:    Past Surgical History:   Procedure Laterality Date     AORTIC VALVE REPLACEMENT      bioprosthetic     BLEPHAROPLASTY       BYPASS GRAFT ARTERY CORONARY      single vessel (LIMA to LAD)     COLONOSCOPY  2018     EGD  2018     ESOPHAGOSCOPY, GASTROSCOPY, DUODENOSCOPY (EGD), COMBINED N/A 9/23/2022    Procedure: ESOPHAGOGASTRODUODENOSCOPY, WITH BIOPSY;  Surgeon: Min Rodriguez DO;  Location: [facility]     IMPLANT PACEMAKER       MITRAL VALVE REPLACEMENT      bioprosthetic     RELEASE TRIGGER FINGER Bilateral 2020     TOTAL HIP ARTHROPLASTY  2011     Family History:    Family History   Problem Relation Age of Onset     Asthma Mother      Heart Failure Father      Myocardial Infarction Father      Social History:  Marital Status:   [2]  Social History     Tobacco Use     Smoking status: Never     Smokeless tobacco: Never   Vaping Use     Vaping Use: Never used   Substance Use Topics     Alcohol use: Yes     Alcohol/week: 7.0 standard drinks     Types:  "7 Glasses of wine per week     Comment: 1 glass/nightly      Medications:    alendronate (FOSAMAX) 70 MG tablet  atorvastatin (LIPITOR) 80 MG tablet  EUTHYROX 88 MCG tablet  ferrous sulfate (FEROSUL) 325 (65 Fe) MG tablet  FLUoxetine (PROZAC) 20 MG capsule  furosemide (LASIX) 80 MG tablet  metoprolol succinate ER (TOPROL XL) 25 MG 24 hr tablet  PACERONE 100 MG TABS tablet  pantoprazole (PROTONIX) 40 MG EC tablet  spironolactone (ALDACTONE) 25 MG tablet  tiZANidine (ZANAFLEX) 2 MG tablet      Review of Systems   All other systems reviewed and are negative.      PE   BP: 124/80  Pulse: 61  Temp: 97.7  F (36.5  C)  Resp: 20  Height: 162.6 cm (5' 4\")  Weight: 67.1 kg (148 lb)  SpO2: 95 %  Physical Exam  Vitals and nursing note reviewed.   Constitutional:       General: He is not in acute distress.  HENT:      Head: Atraumatic.      Right Ear: External ear normal.      Left Ear: External ear normal.      Nose: Nose normal.      Mouth/Throat:      Mouth: Mucous membranes are moist.      Pharynx: Oropharynx is clear.   Eyes:      General: No scleral icterus.     Extraocular Movements: Extraocular movements intact.      Conjunctiva/sclera: Conjunctivae normal.      Pupils: Pupils are equal, round, and reactive to light.   Cardiovascular:      Rate and Rhythm: Normal rate.   Pulmonary:      Effort: Pulmonary effort is normal. No respiratory distress.   Abdominal:      Palpations: Abdomen is soft.      Tenderness: There is no abdominal tenderness.   Musculoskeletal:         General: Normal range of motion.      Cervical back: Normal range of motion.      Comments: Pitting edema above his sock line.   Skin:     General: Skin is warm and dry.   Neurological:      Mental Status: He is alert and oriented to person, place, and time.   Psychiatric:         Behavior: Behavior normal.         ED COURSE and MDM   1900.  The patient has reports of anemia and melena.  He has lightheadedness which is new.  Normal pulse.  Normal blood " pressure here at 124/80, 116/60 in the clinic on 1/12/2023.  Repeat blood work pending.  IV lock.    2013.  Patient has hemoglobin drop from 10.5-9.8.  Vital signs normal.  No active symptoms other than fatigue.  No falling over the past 2 weeks.  No fainting.  Increasing shortness of breath and fatigue with exertion.  No active GI bleeding here in the ED.  Last episode of reported mixed black stool and brown stool this morning.  INR still 4.01.  Hold Coumadin today, tomorrow, and Wednesday.  Urgent follow-up this week, recommendations given to call clinic (outside [place]).    2014.  The patient, their parent if applicable, and/or their medical decision maker(s) and I have reviewed all of the available historical information, applicable PMH, physical exam findings, and objective diagnostic data gathered during this ED visit.  We then discussed all work-up options and then together agreed upon the course taken during this visit.  The ultimate disposition and plan was a cooperative decision made between myself and the patient, their parent if applicable, and/or their legal decision maker(s).  The risks and benefits of all decisions made during this visit were discussed to the best of my abilities given the circumstances, and all parties are understanding of the pertinent ramifications of these decisions.      LABS  Labs Ordered and Resulted from Time of ED Arrival to Time of ED Departure   INR - Abnormal       Result Value    INR 4.07 (*)    COMPREHENSIVE METABOLIC PANEL - Abnormal    Sodium 139      Potassium 4.8      Chloride 104      Carbon Dioxide (CO2) 25      Anion Gap 10      Urea Nitrogen 33.3 (*)     Creatinine 0.89      Calcium 8.6 (*)     Glucose 101 (*)     Alkaline Phosphatase 98      AST 37      ALT 28      Protein Total 6.4      Albumin 3.7      Bilirubin Total 0.4      GFR Estimate 85     CBC WITH PLATELETS AND DIFFERENTIAL - Abnormal    WBC Count 5.4      RBC Count 3.01 (*)     Hemoglobin 9.8 (*)      Hematocrit 29.7 (*)     MCV 99      MCH 32.6      MCHC 33.0      RDW 16.1 (*)     Platelet Count 140 (*)     % Neutrophils 65      % Lymphocytes 20      % Monocytes 12      % Eosinophils 2      % Basophils 0      % Immature Granulocytes 1      NRBCs per 100 WBC 0      Absolute Neutrophils 3.5      Absolute Lymphocytes 1.1      Absolute Monocytes 0.7      Absolute Eosinophils 0.1      Absolute Basophils 0.0      Absolute Immature Granulocytes 0.0      Absolute NRBCs 0.0         IMAGING  Images reviewed by me.  Radiology report also reviewed.  No orders to display       Procedures    Medications - No data to display      IMPRESSION       ICD-10-CM    1. Melena  K92.1       2. Elevated INR  R79.1       3. Anemia, unspecified type  D64.9                Medication List      There are no discharge medications for this visit.                     Christopher Osorio MD  01/16/23 2014

## 2023-03-25 ENCOUNTER — HOSPITAL ENCOUNTER (EMERGENCY)
Facility: CLINIC | Age: 83
Discharge: OTHER INSTITUTION | End: 2023-03-25
Attending: NURSE PRACTITIONER | Admitting: NURSE PRACTITIONER
Payer: COMMERCIAL

## 2023-03-25 VITALS
WEIGHT: 138 LBS | HEART RATE: 60 BPM | HEIGHT: 61 IN | BODY MASS INDEX: 26.06 KG/M2 | SYSTOLIC BLOOD PRESSURE: 106 MMHG | TEMPERATURE: 98.6 F | DIASTOLIC BLOOD PRESSURE: 62 MMHG | RESPIRATION RATE: 18 BRPM | OXYGEN SATURATION: 92 %

## 2023-03-25 DIAGNOSIS — E03.9 HYPOTHYROIDISM (ACQUIRED): ICD-10-CM

## 2023-03-25 DIAGNOSIS — G47.33 OSA ON CPAP: ICD-10-CM

## 2023-03-25 DIAGNOSIS — Z79.01 CHRONIC ANTICOAGULATION: ICD-10-CM

## 2023-03-25 DIAGNOSIS — F41.8 DEPRESSION WITH ANXIETY: ICD-10-CM

## 2023-03-25 DIAGNOSIS — Z95.810 BIVENTRICULAR ICD (IMPLANTABLE CARDIOVERTER-DEFIBRILLATOR) IN PLACE: ICD-10-CM

## 2023-03-25 DIAGNOSIS — Z79.01 ANTICOAGULATION MONITORING, INR RANGE 2-3: ICD-10-CM

## 2023-03-25 DIAGNOSIS — I48.0 PAROXYSMAL ATRIAL FIBRILLATION (H): ICD-10-CM

## 2023-03-25 DIAGNOSIS — K92.1 GASTROINTESTINAL HEMORRHAGE WITH MELENA: Primary | ICD-10-CM

## 2023-03-25 DIAGNOSIS — D62 ANEMIA DUE TO BLOOD LOSS, ACUTE: ICD-10-CM

## 2023-03-25 DIAGNOSIS — F03.A0 MILD DEMENTIA (H): ICD-10-CM

## 2023-03-25 LAB
ABO/RH(D): NORMAL
ALBUMIN SERPL BCG-MCNC: 3.6 G/DL (ref 3.5–5.2)
ALP SERPL-CCNC: 78 U/L (ref 40–129)
ALT SERPL W P-5'-P-CCNC: 20 U/L (ref 10–50)
ANION GAP SERPL CALCULATED.3IONS-SCNC: 11 MMOL/L (ref 7–15)
ANTIBODY SCREEN: NEGATIVE
APTT PPP: 29 SECONDS (ref 22–38)
AST SERPL W P-5'-P-CCNC: 31 U/L (ref 10–50)
BASOPHILS # BLD AUTO: 0 10E3/UL (ref 0–0.2)
BASOPHILS NFR BLD AUTO: 0 %
BILIRUB SERPL-MCNC: 0.4 MG/DL
BLD PROD TYP BPU: NORMAL
BLD PROD TYP BPU: NORMAL
BLOOD COMPONENT TYPE: NORMAL
BLOOD COMPONENT TYPE: NORMAL
BUN SERPL-MCNC: 57.6 MG/DL (ref 8–23)
CALCIUM SERPL-MCNC: 8.2 MG/DL (ref 8.8–10.2)
CHLORIDE SERPL-SCNC: 96 MMOL/L (ref 98–107)
CODING SYSTEM: NORMAL
CODING SYSTEM: NORMAL
CREAT SERPL-MCNC: 1.21 MG/DL (ref 0.67–1.17)
CROSSMATCH: NORMAL
CROSSMATCH: NORMAL
DEPRECATED HCO3 PLAS-SCNC: 25 MMOL/L (ref 22–29)
EOSINOPHIL # BLD AUTO: 0.1 10E3/UL (ref 0–0.7)
EOSINOPHIL NFR BLD AUTO: 1 %
ERYTHROCYTE [DISTWIDTH] IN BLOOD BY AUTOMATED COUNT: 17 % (ref 10–15)
GFR SERPL CREATININE-BSD FRML MDRD: 59 ML/MIN/1.73M2
GLUCOSE SERPL-MCNC: 151 MG/DL (ref 70–99)
HCT VFR BLD AUTO: 18.5 % (ref 40–53)
HEMOCCULT STL QL: POSITIVE
HGB BLD-MCNC: 6.1 G/DL (ref 13.3–17.7)
HOLD SPECIMEN: NORMAL
IMM GRANULOCYTES # BLD: 0.1 10E3/UL
IMM GRANULOCYTES NFR BLD: 1 %
INR PPP: 1.48 (ref 0.85–1.15)
ISSUE DATE AND TIME: NORMAL
ISSUE DATE AND TIME: NORMAL
LYMPHOCYTES # BLD AUTO: 1.1 10E3/UL (ref 0.8–5.3)
LYMPHOCYTES NFR BLD AUTO: 14 %
MCH RBC QN AUTO: 34.3 PG (ref 26.5–33)
MCHC RBC AUTO-ENTMCNC: 33 G/DL (ref 31.5–36.5)
MCV RBC AUTO: 104 FL (ref 78–100)
MONOCYTES # BLD AUTO: 1.2 10E3/UL (ref 0–1.3)
MONOCYTES NFR BLD AUTO: 15 %
NEUTROPHILS # BLD AUTO: 5.4 10E3/UL (ref 1.6–8.3)
NEUTROPHILS NFR BLD AUTO: 69 %
NRBC # BLD AUTO: 0 10E3/UL
NRBC BLD AUTO-RTO: 0 /100
PLATELET # BLD AUTO: 144 10E3/UL (ref 150–450)
POTASSIUM SERPL-SCNC: 4.7 MMOL/L (ref 3.4–5.3)
PROT SERPL-MCNC: 5.8 G/DL (ref 6.4–8.3)
RBC # BLD AUTO: 1.78 10E6/UL (ref 4.4–5.9)
SODIUM SERPL-SCNC: 132 MMOL/L (ref 136–145)
SPECIMEN EXPIRATION DATE: NORMAL
UNIT ABO/RH: NORMAL
UNIT ABO/RH: NORMAL
UNIT NUMBER: NORMAL
UNIT NUMBER: NORMAL
UNIT STATUS: NORMAL
UNIT STATUS: NORMAL
UNIT TYPE ISBT: 6200
UNIT TYPE ISBT: 6200
WBC # BLD AUTO: 7.8 10E3/UL (ref 4–11)

## 2023-03-25 PROCEDURE — 85730 THROMBOPLASTIN TIME PARTIAL: CPT | Performed by: NURSE PRACTITIONER

## 2023-03-25 PROCEDURE — 82272 OCCULT BLD FECES 1-3 TESTS: CPT | Performed by: NURSE PRACTITIONER

## 2023-03-25 PROCEDURE — 258N000003 HC RX IP 258 OP 636: Performed by: NURSE PRACTITIONER

## 2023-03-25 PROCEDURE — 36430 TRANSFUSION BLD/BLD COMPNT: CPT | Performed by: NURSE PRACTITIONER

## 2023-03-25 PROCEDURE — 93005 ELECTROCARDIOGRAM TRACING: CPT | Performed by: NURSE PRACTITIONER

## 2023-03-25 PROCEDURE — 96361 HYDRATE IV INFUSION ADD-ON: CPT | Performed by: NURSE PRACTITIONER

## 2023-03-25 PROCEDURE — C9113 INJ PANTOPRAZOLE SODIUM, VIA: HCPCS | Performed by: NURSE PRACTITIONER

## 2023-03-25 PROCEDURE — 96374 THER/PROPH/DIAG INJ IV PUSH: CPT | Performed by: NURSE PRACTITIONER

## 2023-03-25 PROCEDURE — 250N000011 HC RX IP 250 OP 636: Performed by: NURSE PRACTITIONER

## 2023-03-25 PROCEDURE — 86901 BLOOD TYPING SEROLOGIC RH(D): CPT | Performed by: NURSE PRACTITIONER

## 2023-03-25 PROCEDURE — 36415 COLL VENOUS BLD VENIPUNCTURE: CPT | Performed by: NURSE PRACTITIONER

## 2023-03-25 PROCEDURE — 86850 RBC ANTIBODY SCREEN: CPT | Performed by: NURSE PRACTITIONER

## 2023-03-25 PROCEDURE — 99285 EMERGENCY DEPT VISIT HI MDM: CPT | Mod: 25 | Performed by: NURSE PRACTITIONER

## 2023-03-25 PROCEDURE — 86923 COMPATIBILITY TEST ELECTRIC: CPT | Performed by: NURSE PRACTITIONER

## 2023-03-25 PROCEDURE — 85025 COMPLETE CBC W/AUTO DIFF WBC: CPT | Performed by: NURSE PRACTITIONER

## 2023-03-25 PROCEDURE — 93010 ELECTROCARDIOGRAM REPORT: CPT | Performed by: NURSE PRACTITIONER

## 2023-03-25 PROCEDURE — P9016 RBC LEUKOCYTES REDUCED: HCPCS | Performed by: NURSE PRACTITIONER

## 2023-03-25 PROCEDURE — 80053 COMPREHEN METABOLIC PANEL: CPT | Performed by: NURSE PRACTITIONER

## 2023-03-25 PROCEDURE — 85610 PROTHROMBIN TIME: CPT | Performed by: NURSE PRACTITIONER

## 2023-03-25 RX ORDER — ACETAMINOPHEN 500 MG
1000 TABLET ORAL 3 TIMES DAILY
COMMUNITY

## 2023-03-25 RX ORDER — WARFARIN SODIUM 3 MG/1
TABLET ORAL
COMMUNITY
Start: 2023-03-23 | End: 2024-01-16

## 2023-03-25 RX ADMIN — SODIUM CHLORIDE, POTASSIUM CHLORIDE, SODIUM LACTATE AND CALCIUM CHLORIDE 1000 ML: 600; 310; 30; 20 INJECTION, SOLUTION INTRAVENOUS at 14:32

## 2023-03-25 RX ADMIN — PANTOPRAZOLE SODIUM 80 MG: 40 INJECTION, POWDER, FOR SOLUTION INTRAVENOUS at 16:04

## 2023-03-25 ASSESSMENT — ACTIVITIES OF DAILY LIVING (ADL)
ADLS_ACUITY_SCORE: 35

## 2023-03-25 NOTE — ED PROVIDER NOTES
History     Chief Complaint   Patient presents with     Dental Problem     GI Bleeding     HPI  Shaka Agurire is a 82 year old male with past medical history of biventricular defibrillator and bioprosthetic valve, mild dementia, systolic congestive heart failure, previous gastrointestinal hemorrhage, chronic anticoagulation, atrial fibrillation, hypertension, hypothyroidism who presents to the emergency department with concerns for vomiting blood.  Patient reports he went to the dentist approximately a week ago and had a dental repair.  At that time they advised him to stop his Coumadin (3/20/2023) and planned for a dental extraction.  His dental extraction was completed on March 23.  (4 teeth were removed.)  Patient reports there was no complications at that time.  Patient states yesterday he felt bloated and also cites a decreased appetite.  Patient states he woke up in the middle of the night and vomited bright red blood multiple times and nothing since.  Patient reports he has had black stools for approximately 4 days.  Patient states this is not uncommon for him and he intermittently has black stools.  Patient denies any difficulty urinating.  Patient also denying any mental confusion, left or right-sided body weakness, chest pain, cough, shortness of breath, difficulty breathing.  Patient denies any abdominal pain presently.    Allergies:  Allergies   Allergen Reactions     Pollen Extract        Problem List:    Patient Active Problem List    Diagnosis Date Noted     Gastrointestinal hemorrhage, unspecified gastrointestinal hemorrhage type 09/22/2022     Priority: Medium     Status post aortic valve replacement with bioprosthetic valve 09/22/2022     Priority: Medium     Mild dementia 08/23/2022     Priority: Medium     Formatting of this note might be different from the original.  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       Thrombocytopenia  (H) 08/25/2020     Priority: Medium     Generalized osteoarthritis of hand 09/09/2019     Priority: Medium     Chronic systolic congestive heart failure (H) 01/01/2019     Priority: Medium     Biventricular ICD (implantable cardioverter-defibrillator) in place 10/23/2018     Priority: Medium     Overview:   Date of last device in office evaluation: 7/27/18    ?  and model:   Easy Tempo Inogen G146  BiV ICD   * Date of implant: 12/20/16              ? Indication for device: CHF; cardiomyopathy; AVR,MVR; atrial flutter/fibrillation; ventricular tachycardia; 100% V paced    ? Cardiac resynchronization therapy:  yes  ? Battery longevity documented as less than 3  Months: no  ? Are any of the leads less than 3 months old:no    ? Programming              ? Pacing mode and programmed lower rate: DDDR 60 bpm              ? ICD therapy                     ? Lowest heart rate for shock delivery: 170bpm                     ? Lowest heart rate for ATP delivery:  170bpm    ? Rate-responsive sensor type, if programmed on: accelerometer on       Underlying rhythm and heart rate if it can be determined:  sinus bradycardia with 1st deg AVB @ 51 bpm       * For ICD, will magnet disable detection? yes  ? Will ICD detections resume automatically with removal of  the magnet? yes                                                                        ? Any alert status on CIED generator or lead: no  ? Last pacing threshold    Atrial   1V @ 0.4ms           Ventricular   RV 1V @ 0.4ms      LV 0.9V @ 0.4ms       Anemia 10/18/2018     Priority: Medium     Hyponatremia 10/18/2018     Priority: Medium     Iron deficiency anemia secondary to inadequate dietary iron intake 10/18/2018     Priority: Medium     Formatting of this note might be different from the original.  Negative EGD, colonoscopy, [facility].       Age-related osteoporosis without current pathological fracture 08/09/2018     Priority: Medium     Overview:   DEXA at  [place] 6/28/18.  L femur -3.1.       Anticoagulation monitoring, INR range 2-3 02/15/2018     Priority: Medium     Status post mitral valve replacement with bioprosthetic valve 02/15/2018     Priority: Medium     Depression with anxiety 02/15/2018     Priority: Medium     Hypertension 02/15/2018     Priority: Medium     Hypothyroidism (acquired) 02/15/2018     Priority: Medium     MARISELA on CPAP 02/15/2018     Priority: Medium     Paroxysmal atrial fibrillation (H) 02/15/2018     Priority: Medium        Past Medical History:    Past Medical History:   Diagnosis Date     Age-related osteoporosis without current pathological fracture 8/9/2018     Biventricular ICD (implantable cardioverter-defibrillator) in place 10/23/2018     Chronic systolic congestive heart failure (H) 1/1/2019     Depression with anxiety 2/15/2018     Hypertension 2/15/2018     Hypothyroidism (acquired) 2/15/2018     Iron deficiency anemia secondary to inadequate dietary iron intake 10/18/2018     Mild dementia 8/23/2022     MARISELA on CPAP 2/15/2018     Paroxysmal atrial fibrillation (H) 2/15/2018     Status post aortic valve replacement with bioprosthetic valve 9/22/2022     Status post mitral valve replacement with bioprosthetic valve 2/15/2018     Thrombocytopenia (H) 8/25/2020       Past Surgical History:    Past Surgical History:   Procedure Laterality Date     AORTIC VALVE REPLACEMENT      bioprosthetic     BLEPHAROPLASTY       BYPASS GRAFT ARTERY CORONARY      single vessel (LIMA to LAD)     COLONOSCOPY  2018     EGD  2018     ESOPHAGOSCOPY, GASTROSCOPY, DUODENOSCOPY (EGD), COMBINED N/A 9/23/2022    Procedure: ESOPHAGOGASTRODUODENOSCOPY, WITH BIOPSY;  Surgeon: Min Rodriguez DO;  Location: [facility]     IMPLANT PACEMAKER       MITRAL VALVE REPLACEMENT      bioprosthetic     RELEASE TRIGGER FINGER Bilateral 2020     TOTAL HIP ARTHROPLASTY  2011       Family History:    Family History   Problem Relation Age of Onset     Asthma Mother       "Heart Failure Father      Myocardial Infarction Father        Social History:  Marital Status:   [2]  Social History     Tobacco Use     Smoking status: Never     Smokeless tobacco: Never   Vaping Use     Vaping Use: Never used   Substance Use Topics     Alcohol use: Yes     Alcohol/week: 7.0 standard drinks     Types: 7 Glasses of wine per week     Comment: 1 glass/nightly        Medications:    acetaminophen (TYLENOL) 500 MG tablet  alendronate (FOSAMAX) 70 MG tablet  Ascorbic Acid (VITAMIN C PO)  atorvastatin (LIPITOR) 80 MG tablet  EUTHYROX 88 MCG tablet  ferrous sulfate (FEROSUL) 325 (65 Fe) MG tablet  FLUoxetine (PROZAC) 20 MG capsule  furosemide (LASIX) 80 MG tablet  metoprolol succinate ER (TOPROL XL) 25 MG 24 hr tablet  Multiple Vitamin (MULTIVITAMIN PO)  Omega-3 Fatty Acids (FISH OIL PO)  PACERONE 100 MG TABS tablet  pantoprazole (PROTONIX) 40 MG EC tablet  spironolactone (ALDACTONE) 25 MG tablet  tiZANidine (ZANAFLEX) 2 MG tablet  VITAMIN D PO  warfarin ANTICOAGULANT (COUMADIN) 3 MG tablet      Review of Systems  As mentioned above in the history present illness. All other systems were reviewed and are negative.    Physical Exam   BP: 104/71  Pulse: 70  Temp: 97.5  F (36.4  C)  Resp: 14  Height: 154.9 cm (5' 1\")  Weight: 62.6 kg (138 lb)  SpO2: 96 %      Physical Exam  Vitals and nursing note reviewed.   Constitutional:       General: He is not in acute distress.     Appearance: Normal appearance. He is well-developed. He is ill-appearing. He is not toxic-appearing or diaphoretic.   HENT:      Head: Normocephalic and atraumatic.      Right Ear: Tympanic membrane, ear canal and external ear normal.      Left Ear: Tympanic membrane, ear canal and external ear normal.      Nose: Nose normal.      Mouth/Throat:      Mouth: Mucous membranes are moist.      Pharynx: Uvula midline. No oropharyngeal exudate or posterior oropharyngeal erythema.      Comments: Recent dental extractions appear to be healing " well, no active bleeding, erythema, swelling  Eyes:      General:         Right eye: No discharge.         Left eye: No discharge.      Conjunctiva/sclera: Conjunctivae normal.   Neck:      Thyroid: No thyromegaly.   Cardiovascular:      Rate and Rhythm: Normal rate and regular rhythm.      Heart sounds: Normal heart sounds. No murmur heard.    No friction rub. No gallop.   Pulmonary:      Effort: Pulmonary effort is normal.      Breath sounds: Normal breath sounds. No stridor. No wheezing.   Abdominal:      General: Abdomen is protuberant. Bowel sounds are normal. There is no distension.      Palpations: Abdomen is soft. There is no mass.      Tenderness: There is no abdominal tenderness. There is no right CVA tenderness, left CVA tenderness, guarding or rebound.   Genitourinary:     Rectum: Normal. Guaiac result positive.      Comments: Melena stools noted at anus  Lymphadenopathy:      Cervical: No cervical adenopathy.   Skin:     General: Skin is warm.      Capillary Refill: Capillary refill takes less than 2 seconds.      Coloration: Skin is pale.      Findings: No rash.   Neurological:      General: No focal deficit present.      Mental Status: He is alert.   Psychiatric:         Mood and Affect: Mood normal.         Behavior: Behavior is cooperative.         ED Course              ED Course as of 03/25/23 1956   Sat Mar 25, 2023   1451 Lab call regarding critical value of hemoglobin of 6.1.  Protonix ordered.  Consultation completed with general surgeon Dr. Umaña and due to patient history of mechanical heart valves is not a candidate for staying here.  Will place call and attempt to find bed placement with GI physician.  2 units of blood products ordered.   1628 Phone consultation completed with Dr. Paresh Contreras, hospitalist from [facility] who agrees to take patient for acute GI bleed with history of mechanical heart valves on chronic anticoagulation.  Waiting on phone call back for bed placement.   1630 Updated  patient on status of plan transfer to [facility].  Waiting for phone call back from bed placement.  Advised patient that weight could be 1 to 10 hours.   1856 Phone call from [facility] and reports provided from our nurse to their nurse.  Updated patient.     Procedures            EKG Interpretation:      EKG Number: 1  Interpreted by Val Lopez, RORY CROWDER, Jeancarlos Wilkerson MD  Symptoms at time of EKG: None   Rhythm: Normal sinus  and Paced  Rate: Normal  Comparison to prior: Unchanged from 1/16/2023    Clinical Impression: no acute changes and paced rhythm        Results for orders placed or performed during the hospital encounter of 03/25/23 (from the past 24 hour(s))   [place] Draw    Narrative    The following orders were created for panel order [place] Draw.  Procedure                               Abnormality         Status                     ---------                               -----------         ------                     Extra Purple Top Tube[[id number]]                            Final result                 Please view results for these tests on the individual orders.   Extra Purple Top Tube   Result Value Ref Range    Hold Specimen [facility]    ABO/Rh type and screen    Narrative    The following orders were created for panel order ABO/Rh type and screen.  Procedure                               Abnormality         Status                     ---------                               -----------         ------                     Adult Type and Screen[[id number]]                            Final result                 Please view results for these tests on the individual orders.   Adult Type and Screen   Result Value Ref Range    ABO/RH(D) A POS     Antibody Screen Negative Negative    SPECIMEN EXPIRATION DATE [id number]    CBC with platelets differential    Narrative    The following orders were created for panel order CBC with platelets differential.  Procedure                               Abnormality          Status                     ---------                               -----------         ------                     CBC with platelets and d...[[id number]]  Abnormal            Final result                 Please view results for these tests on the individual orders.   Comprehensive metabolic panel   Result Value Ref Range    Sodium 132 (L) 136 - 145 mmol/L    Potassium 4.7 3.4 - 5.3 mmol/L    Chloride 96 (L) 98 - 107 mmol/L    Carbon Dioxide (CO2) 25 22 - 29 mmol/L    Anion Gap 11 7 - 15 mmol/L    Urea Nitrogen 57.6 (H) 8.0 - 23.0 mg/dL    Creatinine 1.21 (H) 0.67 - 1.17 mg/dL    Calcium 8.2 (L) 8.8 - 10.2 mg/dL    Glucose 151 (H) 70 - 99 mg/dL    Alkaline Phosphatase 78 40 - 129 U/L    AST 31 10 - 50 U/L    ALT 20 10 - 50 U/L    Protein Total 5.8 (L) 6.4 - 8.3 g/dL    Albumin 3.6 3.5 - 5.2 g/dL    Bilirubin Total 0.4 <=1.2 mg/dL    GFR Estimate 59 (L) >60 mL/min/1.73m2   CBC with platelets and differential   Result Value Ref Range    WBC Count 7.8 4.0 - 11.0 10e3/uL    RBC Count 1.78 (L) 4.40 - 5.90 10e6/uL    Hemoglobin 6.1 (LL) 13.3 - 17.7 g/dL    Hematocrit 18.5 (L) 40.0 - 53.0 %     (H) 78 - 100 fL    MCH 34.3 (H) 26.5 - 33.0 pg    MCHC 33.0 31.5 - 36.5 g/dL    RDW 17.0 (H) 10.0 - 15.0 %    Platelet Count 144 (L) 150 - 450 10e3/uL    % Neutrophils 69 %    % Lymphocytes 14 %    % Monocytes 15 %    % Eosinophils 1 %    % Basophils 0 %    % Immature Granulocytes 1 %    NRBCs per 100 WBC 0 <1 /100    Absolute Neutrophils 5.4 1.6 - 8.3 10e3/uL    Absolute Lymphocytes 1.1 0.8 - 5.3 10e3/uL    Absolute Monocytes 1.2 0.0 - 1.3 10e3/uL    Absolute Eosinophils 0.1 0.0 - 0.7 10e3/uL    Absolute Basophils 0.0 0.0 - 0.2 10e3/uL    Absolute Immature Granulocytes 0.1 <=0.4 10e3/uL    Absolute NRBCs 0.0 10e3/uL   INR   Result Value Ref Range    INR 1.48 (H) 0.85 - 1.15   Partial thromboplastin time   Result Value Ref Range    aPTT 29 22 - 38 Seconds   Prepare red blood cells (unit)   Result Value Ref Range    Blood  Component Type Red Blood Cells     Product Code A5620K32     Unit Status Issued     Unit Number [id number]     CROSSMATCH Compatible     CODING SYSTEM [id number]     ISSUE DATE AND TIME [id number]     UNIT ABO/RH A+     UNIT TYPE ISBT 6200    Prepare red blood cells (unit)   Result Value Ref Range    Blood Component Type Red Blood Cells     Product Code X5360I10     Unit Status Issued     Unit Number [id number]     CROSSMATCH Compatible     CODING SYSTEM [id number]     ISSUE DATE AND TIME [id number]     UNIT ABO/RH A+     UNIT TYPE ISBT 6200    Occult blood stool   Result Value Ref Range    Occult Blood Positive (A) Negative       Medications   lactated ringers BOLUS 1,000 mL (0 mLs Intravenous Stopped 3/25/23 1852)   pantoprazole (PROTONIX) IV push injection 80 mg (80 mg Intravenous $Given 3/25/23 1604)       Assessments & Plan (with Medical Decision Making)     I have reviewed the nursing notes.    I have reviewed the findings, diagnosis, plan and need for follow up with the patient.  Shaka Aguirre is a 82 year old male with past medical history of biventricular defibrillator and bioprosthetic valve, mild dementia, systolic congestive heart failure, previous gastrointestinal hemorrhage, chronic anticoagulation, atrial fibrillation, hypertension, hypothyroidism who presents to the emergency department with concerns for vomiting blood.  Patient reports he went to the dentist approximately a week ago and had a dental repair.  At that time they advised him to stop his Coumadin (3/20/2023) and planned for a dental extraction.  His dental extraction was completed on March 23.  (4 teeth were removed.)  Patient reports there was no complications at that time.  Patient states yesterday he felt bloated and also cites a decreased appetite.  Patient states he woke up in the middle of the night and vomited bright red blood multiple times and nothing since.  On presentation to the emergency department patient is  pale and hemodynamically stable with blood pressure 114/62, temp 98.1, pulse 60, respiratory 16, O2 saturation is 96%.  Patient is alert and frequently forgetful.  He admits that he has dementia.  Wife reports he is at his baseline.  Patient able to tell me the year.  Abdominal exam reveals no distention or tenderness noted nor rebound or guarding.  Rectal exam reveals melena in the stool.  Oral exam reveals recent dental extraction has tooth well-healed without any signs of active bleeding.  Work-up reveals hemoglobin to be 6.1.  Concern for upper GI bleed and immediately ordered 2 units of blood.  Protonix ordered.  Consultation completed with Dr. Whelan general surgeon here and due to patient's bioprosthetic valves requires GI specialist and higher level of care.  No beds available at the remaining [place] locations.  Consultation completed with [facility] spoke with a Dr. Paresh Contreras and he agrees to take patient on transfer.  Patient remained stable throughout time here in the emergency room.  Dr. Leon Varela is collaborative physician and care of this patient.    New Prescriptions    No medications on file       Final diagnoses:   Gastrointestinal hemorrhage with melena   Anemia due to blood loss, acute   Biventricular ICD (implantable cardioverter-defibrillator) in place   MARISELA on CPAP   Paroxysmal atrial fibrillation (H)   Mild dementia   Hypothyroidism (acquired)   Depression with anxiety   Chronic anticoagulation   Anticoagulation monitoring, INR range 2-3       3/25/2023   [facility] EMERGENCY DEPT     John, RORY Payne CNP  03/25/23 1956

## 2023-03-25 NOTE — ED TRIAGE NOTES
Patient reports he had 4 teeth upper left, 1 upper right tooth removed on Thursday. Bleeding after extraction. On Coumadin. Last dose Wednesday, restarting tonight. Yesterday had dark black bowel movement, today emesis of bright red blood. Hx of dark stools and needing blood transfusion. Last colonoscopy ~ 5 years ago. Surgery 4/25 repair whole in heart. SOB r/t heart.      Triage Assessment     Row Name 03/25/23 1218       Triage Assessment (Adult)    Airway WDL WDL       Respiratory WDL    Respiratory WDL X;rhythm/pattern    Rhythm/Pattern, Respiratory shortness of breath       Skin Circulation/Temperature WDL    Skin Circulation/Temperature WDL WDL       Cardiac WDL    Cardiac WDL WDL       Peripheral/Neurovascular WDL    Peripheral Neurovascular WDL WDL       Cognitive/Neuro/Behavioral WDL    Cognitive/Neuro/Behavioral WDL WDL

## 2023-04-28 ENCOUNTER — TRANSCRIBE ORDERS (OUTPATIENT)
Dept: OTHER | Age: 83
End: 2023-04-28

## 2023-05-26 ENCOUNTER — TRANSCRIBE ORDERS (OUTPATIENT)
Dept: OTHER | Age: 83
End: 2023-05-26

## 2023-05-26 DIAGNOSIS — Z98.890 S/P MITRAL VALVE REPAIR: Primary | ICD-10-CM

## 2023-06-16 ENCOUNTER — HOSPITAL ENCOUNTER (OUTPATIENT)
Dept: CARDIAC REHAB | Facility: CLINIC | Age: 83
Discharge: HOME | End: 2023-06-16
Attending: INTERNAL MEDICINE
Payer: COMMERCIAL

## 2023-06-16 PROCEDURE — 93797 PHYS/QHP OP CAR RHAB WO ECG: CPT | Mod: XU

## 2023-06-16 PROCEDURE — 93798 PHYS/QHP OP CAR RHAB W/ECG: CPT

## 2023-06-21 ENCOUNTER — HOSPITAL ENCOUNTER (OUTPATIENT)
Dept: CARDIAC REHAB | Facility: CLINIC | Age: 83
Discharge: HOME | End: 2023-06-21
Attending: INTERNAL MEDICINE
Payer: COMMERCIAL

## 2023-06-21 PROCEDURE — 93798 PHYS/QHP OP CAR RHAB W/ECG: CPT

## 2023-06-26 ENCOUNTER — HOSPITAL ENCOUNTER (OUTPATIENT)
Dept: CARDIAC REHAB | Facility: CLINIC | Age: 83
Discharge: HOME | End: 2023-06-26
Attending: INTERNAL MEDICINE
Payer: COMMERCIAL

## 2023-06-26 PROCEDURE — 93798 PHYS/QHP OP CAR RHAB W/ECG: CPT

## 2023-07-03 ENCOUNTER — HOSPITAL ENCOUNTER (OUTPATIENT)
Dept: CARDIAC REHAB | Facility: CLINIC | Age: 83
Discharge: HOME | End: 2023-07-03
Attending: INTERNAL MEDICINE
Payer: COMMERCIAL

## 2023-07-03 PROCEDURE — 93798 PHYS/QHP OP CAR RHAB W/ECG: CPT

## 2023-07-05 ENCOUNTER — HOSPITAL ENCOUNTER (OUTPATIENT)
Dept: CARDIAC REHAB | Facility: CLINIC | Age: 83
Discharge: HOME | End: 2023-07-05
Attending: INTERNAL MEDICINE
Payer: COMMERCIAL

## 2023-07-05 PROCEDURE — 93798 PHYS/QHP OP CAR RHAB W/ECG: CPT

## 2023-07-10 ENCOUNTER — HOSPITAL ENCOUNTER (OUTPATIENT)
Dept: CARDIAC REHAB | Facility: CLINIC | Age: 83
Discharge: HOME | End: 2023-07-10
Attending: INTERNAL MEDICINE
Payer: COMMERCIAL

## 2023-07-10 PROCEDURE — 93798 PHYS/QHP OP CAR RHAB W/ECG: CPT

## 2023-07-12 ENCOUNTER — HOSPITAL ENCOUNTER (OUTPATIENT)
Dept: CARDIAC REHAB | Facility: CLINIC | Age: 83
Discharge: HOME | End: 2023-07-12
Attending: INTERNAL MEDICINE
Payer: COMMERCIAL

## 2023-07-12 PROCEDURE — 93798 PHYS/QHP OP CAR RHAB W/ECG: CPT

## 2023-07-17 ENCOUNTER — HOSPITAL ENCOUNTER (OUTPATIENT)
Dept: CARDIAC REHAB | Facility: CLINIC | Age: 83
Discharge: HOME | End: 2023-07-17
Attending: INTERNAL MEDICINE
Payer: COMMERCIAL

## 2023-07-17 PROCEDURE — 93798 PHYS/QHP OP CAR RHAB W/ECG: CPT

## 2023-07-19 ENCOUNTER — HOSPITAL ENCOUNTER (OUTPATIENT)
Dept: CARDIAC REHAB | Facility: CLINIC | Age: 83
Discharge: HOME | End: 2023-07-19
Attending: INTERNAL MEDICINE
Payer: COMMERCIAL

## 2023-07-19 PROCEDURE — 93798 PHYS/QHP OP CAR RHAB W/ECG: CPT

## 2023-07-19 PROCEDURE — 93797 PHYS/QHP OP CAR RHAB WO ECG: CPT | Mod: 59

## 2023-07-24 ENCOUNTER — HOSPITAL ENCOUNTER (OUTPATIENT)
Dept: CARDIAC REHAB | Facility: CLINIC | Age: 83
Discharge: HOME | End: 2023-07-24
Attending: INTERNAL MEDICINE
Payer: COMMERCIAL

## 2023-07-24 PROCEDURE — 93798 PHYS/QHP OP CAR RHAB W/ECG: CPT

## 2023-07-26 ENCOUNTER — HOSPITAL ENCOUNTER (OUTPATIENT)
Dept: CARDIAC REHAB | Facility: CLINIC | Age: 83
Discharge: HOME | End: 2023-07-26
Attending: INTERNAL MEDICINE
Payer: COMMERCIAL

## 2023-07-26 PROCEDURE — 93798 PHYS/QHP OP CAR RHAB W/ECG: CPT

## 2023-07-31 ENCOUNTER — HOSPITAL ENCOUNTER (OUTPATIENT)
Dept: CARDIAC REHAB | Facility: CLINIC | Age: 83
Discharge: HOME | End: 2023-07-31
Attending: INTERNAL MEDICINE
Payer: COMMERCIAL

## 2023-07-31 PROCEDURE — 93798 PHYS/QHP OP CAR RHAB W/ECG: CPT

## 2023-08-02 ENCOUNTER — HOSPITAL ENCOUNTER (OUTPATIENT)
Dept: CARDIAC REHAB | Facility: CLINIC | Age: 83
Discharge: HOME | End: 2023-08-02
Attending: INTERNAL MEDICINE
Payer: COMMERCIAL

## 2023-08-02 PROCEDURE — 93798 PHYS/QHP OP CAR RHAB W/ECG: CPT

## 2023-08-14 ENCOUNTER — HOSPITAL ENCOUNTER (OUTPATIENT)
Dept: CARDIAC REHAB | Facility: CLINIC | Age: 83
Discharge: HOME | End: 2023-08-14
Attending: INTERNAL MEDICINE
Payer: COMMERCIAL

## 2023-08-14 PROCEDURE — 93798 PHYS/QHP OP CAR RHAB W/ECG: CPT

## 2023-08-16 ENCOUNTER — HOSPITAL ENCOUNTER (OUTPATIENT)
Dept: CARDIAC REHAB | Facility: CLINIC | Age: 83
Discharge: HOME | End: 2023-08-16
Attending: INTERNAL MEDICINE
Payer: COMMERCIAL

## 2023-08-16 PROCEDURE — 93798 PHYS/QHP OP CAR RHAB W/ECG: CPT

## 2023-08-21 ENCOUNTER — HOSPITAL ENCOUNTER (OUTPATIENT)
Dept: CARDIAC REHAB | Facility: CLINIC | Age: 83
Discharge: HOME | End: 2023-08-21
Attending: INTERNAL MEDICINE
Payer: COMMERCIAL

## 2023-08-21 PROCEDURE — 93798 PHYS/QHP OP CAR RHAB W/ECG: CPT

## 2023-08-23 ENCOUNTER — HOSPITAL ENCOUNTER (OUTPATIENT)
Dept: CARDIAC REHAB | Facility: CLINIC | Age: 83
Discharge: HOME | End: 2023-08-23
Attending: INTERNAL MEDICINE
Payer: COMMERCIAL

## 2023-08-23 PROCEDURE — 93798 PHYS/QHP OP CAR RHAB W/ECG: CPT

## 2023-09-13 ENCOUNTER — HOSPITAL ENCOUNTER (OUTPATIENT)
Dept: CARDIAC REHAB | Facility: CLINIC | Age: 83
Discharge: HOME | End: 2023-09-13
Attending: INTERNAL MEDICINE
Payer: COMMERCIAL

## 2023-09-13 PROCEDURE — 93798 PHYS/QHP OP CAR RHAB W/ECG: CPT

## 2023-09-20 ENCOUNTER — HOSPITAL ENCOUNTER (OUTPATIENT)
Dept: CARDIAC REHAB | Facility: CLINIC | Age: 83
Discharge: HOME | End: 2023-09-20
Attending: INTERNAL MEDICINE
Payer: COMMERCIAL

## 2023-09-20 PROCEDURE — 93798 PHYS/QHP OP CAR RHAB W/ECG: CPT

## 2023-09-25 ENCOUNTER — HOSPITAL ENCOUNTER (OUTPATIENT)
Dept: CARDIAC REHAB | Facility: CLINIC | Age: 83
Discharge: HOME | End: 2023-09-25
Attending: INTERNAL MEDICINE
Payer: COMMERCIAL

## 2024-01-15 ENCOUNTER — TRANSITIONAL CARE UNIT VISIT (OUTPATIENT)
Dept: GERIATRICS | Facility: CLINIC | Age: 84
End: 2024-01-15
Payer: COMMERCIAL

## 2024-01-15 VITALS
HEART RATE: 80 BPM | RESPIRATION RATE: 18 BRPM | OXYGEN SATURATION: 94 % | WEIGHT: 132.5 LBS | SYSTOLIC BLOOD PRESSURE: 119 MMHG | TEMPERATURE: 97.8 F | BODY MASS INDEX: 25.04 KG/M2 | DIASTOLIC BLOOD PRESSURE: 69 MMHG

## 2024-01-15 DIAGNOSIS — S32.591D OTHER CLOSED FRACTURE OF RIGHT PUBIS WITH ROUTINE HEALING, SUBSEQUENT ENCOUNTER: ICD-10-CM
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
## 2024-01-15 DIAGNOSIS — D69.6 THROMBOCYTOPENIA (H): ICD-10-CM

## 2024-01-15 DIAGNOSIS — F33.9 DEPRESSION, RECURRENT (H): ICD-10-CM

## 2024-01-15 DIAGNOSIS — J84.9 ILD (INTERSTITIAL LUNG DISEASE) (H): ICD-10-CM

## 2024-01-15 DIAGNOSIS — I50.22 CHRONIC SYSTOLIC CONGESTIVE HEART FAILURE (H): ICD-10-CM

## 2024-01-15 DIAGNOSIS — F03.A0 MILD DEMENTIA WITHOUT BEHAVIORAL DISTURBANCE, PSYCHOTIC DISTURBANCE, MOOD DISTURBANCE, OR ANXIETY, UNSPECIFIED DEMENTIA TYPE (H): ICD-10-CM

## 2024-01-15 DIAGNOSIS — I48.0 PAROXYSMAL ATRIAL FIBRILLATION (H): ICD-10-CM

## 2024-01-15 PROCEDURE — 99309 SBSQ NF CARE MODERATE MDM 30: CPT | Performed by: NURSE PRACTITIONER

## 2024-01-15 RX ORDER — AMIODARONE HYDROCHLORIDE 100 MG/1
100 TABLET ORAL DAILY
COMMUNITY

## 2024-01-15 RX ORDER — LEVOTHYROXINE SODIUM 100 UG/1
100 TABLET ORAL DAILY
COMMUNITY

## 2024-01-15 RX ORDER — PANTOPRAZOLE SODIUM 20 MG/1
20 TABLET, DELAYED RELEASE ORAL DAILY
COMMUNITY

## 2024-01-15 RX ORDER — CLOPIDOGREL BISULFATE 75 MG/1
75 TABLET ORAL DAILY
COMMUNITY

## 2024-01-15 RX ORDER — AMOXICILLIN 500 MG/1
2000 TABLET, FILM COATED ORAL DAILY PRN
COMMUNITY

## 2024-01-15 RX ORDER — NALOXONE HYDROCHLORIDE 0.4 MG/ML
.1-.4 INJECTION, SOLUTION INTRAMUSCULAR; INTRAVENOUS; SUBCUTANEOUS PRN
COMMUNITY

## 2024-01-15 RX ORDER — ASPIRIN 81 MG/1
81 TABLET, CHEWABLE ORAL DAILY
COMMUNITY

## 2024-01-15 RX ORDER — ATORVASTATIN CALCIUM 80 MG/1
80 TABLET, FILM COATED ORAL DAILY
COMMUNITY

## 2024-01-15 RX ORDER — CALCIUM CARBONATE/VITAMIN D3 500-10/5ML
400 LIQUID (ML) ORAL DAILY
COMMUNITY

## 2024-01-15 RX ORDER — VITAMIN B COMPLEX
25 TABLET ORAL DAILY
COMMUNITY

## 2024-01-15 RX ORDER — METOPROLOL SUCCINATE 25 MG/1
25 TABLET, EXTENDED RELEASE ORAL DAILY
COMMUNITY

## 2024-01-15 NOTE — LETTER
1/15/2024        RE: Shaka Aguirre  [address]  [facility] [address]        [facility] Admission  PCP & CLINIC: Issac Joshi MD, [address] [facility] / [facility] [address]  Chief Complaint   Patient presents with     Hospital F/U   [place] MRN: [id number]. Place of Service where encounter took place:  [facility] (TCU) [4002] Shaka Aguirre  is a 83 year old  (1940), admitted to the above facility from  [facility]. Hospital stay 1/6 through 1/12/24. Admitted to this facility for  rehab, medical management, and nursing care. HPI information obtained from: facility chart records, facility staff, patient report, [facility] chart review, and Care Everywhere [facility] chart review.     Brief Summary of Hospital Course: Keegan presented to [place] on 1/6 s/p fall at home and subsequent pelvic pain. He was found to have an acute mildly displaced fracture of the right inferior ramus and a non-displaced lateral right superior pubic ramus fx. He was treated non-surgically and discharged to TCU.     Updates since admission to transitional care unit: Keegan presented to TCU on 1/12/24 s/p the above hospitalization. Today, Keegan says things are going pretty good.  His appetite is okay, and he denies any nausea or heartburn.  He has a little bit of constipation, but this is normal for him.  He denies any bladder problems.  He denies a headache, but says he gets a little bit of lightheadedness upon standing.  He denies any new shortness of breath, but does feel overall weakness.  He denies a significant cough or fever.  He denies any palpitations or chest pain.  He does feel some right-sided hip and groin pain, and this sometimes radiates down his leg.  He does feel like he is having some mild muscle spasms, but all of these pains and spasms are intermittent and manageable.  He feels that therapy has started off well.  He denies any sleep disturbances.    CODE STATUS/ADVANCE  DIRECTIVES DISCUSSION: CPR/Full code . Patient's living condition: lives alone. ALLERGIES: Pollen extract PAST MEDICAL HISTORY:  has a past medical history of Age-related osteoporosis without current pathological fracture (8/9/2018), Biventricular ICD (implantable cardioverter-defibrillator) in place (10/23/2018), Chronic systolic congestive heart failure (H) (1/1/2019), Depression with anxiety (2/15/2018), Hypertension (2/15/2018), Hypothyroidism (acquired) (2/15/2018), Iron deficiency anemia secondary to inadequate dietary iron intake (10/18/2018), Mild dementia (H) (8/23/2022), MARISELA on CPAP (2/15/2018), Paroxysmal atrial fibrillation (H) (2/15/2018), Status post aortic valve replacement with bioprosthetic valve (9/22/2022), Status post mitral valve replacement with bioprosthetic valve (2/15/2018), and Thrombocytopenia (H) (8/25/2020).. PAST SURGICAL HISTORY:   has a past surgical history that includes aortic valve replacement; mitral valve replacement; Bypass graft artery coronary; Implant pacemaker; Egd (2018); colonoscopy (2018); Release trigger finger (Bilateral, 2020); Total Hip Arthroplasty (2011); Blepharoplasty; and Esophagoscopy, gastroscopy, duodenoscopy (EGD), combined (N/A, 9/23/2022).. FAMILY HISTORY: family history includes Asthma in his mother; Heart Failure in his father; Myocardial Infarction in his father.. SOCIAL HISTORY:   reports that he has never smoked. He has never used smokeless tobacco. He reports current alcohol use of about 7.0 standard drinks of alcohol per week.  Post Discharge Medication Reconciliation Status: discharge medications reconciled and changed, per note/orders.  Current Outpatient Medications   Medication Sig Dispense Refill     acetaminophen (TYLENOL) 500 MG tablet Take 1,000 mg by mouth 3 times daily       alendronate (FOSAMAX) 70 MG tablet Take 70 mg by mouth once a week       amiodarone (PACERONE) 100 MG TABS tablet Take 100 mg by mouth daily       amoxicillin (AMOXIL)  500 MG tablet Take 2,000 mg by mouth daily as needed       aspirin (ASA) 81 MG chewable tablet Take 81 mg by mouth daily       atorvastatin (LIPITOR) 80 MG tablet Take 80 mg by mouth daily       Calcium Carb-Cholecalciferol (CALCIUM + VITAMIN D3 PO) Take 1 tablet by mouth 2 times daily       clopidogrel (PLAVIX) 75 MG tablet Take 75 mg by mouth daily       ferrous sulfate (FEROSUL) 325 (65 Fe) MG tablet Take 325 mg by mouth 2 times daily       FLUoxetine (PROZAC) 20 MG capsule Take 40 mg by mouth daily       furosemide (LASIX) 80 MG tablet Take 80 mg by mouth daily       levothyroxine (SYNTHROID/LEVOTHROID) 100 MCG tablet Take 100 mcg by mouth daily       magnesium oxide 400 MG CAPS Take 400 mg by mouth daily       metoprolol succinate ER (TOPROL XL) 25 MG 24 hr tablet Take 25 mg by mouth daily       naloxone (NARCAN) 0.4 MG/ML injection Inject 0.1-0.4 mg into the vein as needed for opioid reversal       pantoprazole (PROTONIX) 20 MG EC tablet Take 20 mg by mouth daily       spironolactone (ALDACTONE) 25 MG tablet Take 12.5 mg by mouth       tiZANidine (ZANAFLEX) 2 MG tablet Take 2 mg by mouth At Bedtime       Vitamin D3 (CHOLECALCIFEROL) 25 mcg (1000 units) tablet Take 25 mcg by mouth daily       ROS: Limited secondary to cognitive impairment but today pt reports the above and 10 point ROS of systems including Constitutional, Eyes, Respiratory, Cardiovascular, Gastroenterology, Genitourinary, Integumentary, Musculoskeletal, Psychiatric were all negative except for pertinent positives noted in my HPI.    Vitals: /69   Pulse 80   Temp 97.8  F (36.6  C)   Resp 18   Wt 60.1 kg (132 lb 8 oz)   SpO2 94%   BMI 25.04 kg/m    Exam:  GENERAL APPEARANCE: Alert, in no distress, cooperative.   ENT: Mouth/posterior oropharynx intact w/ moist mucous membranes, hearing acuity [place].  EYES: EOM, conjunctivae, lids, pupils and irises normal, PERRL2.   RESP: Respiratory effort good, no respiratory distress, Lung sounds  clear/diminished.  On RA.   CV: Auscultation of heart reveals S1, S2, rate and rhythm paced, 2/6 soft systolic murmur, no rub or gallop, Edema 0+ BLE. Peripheral pulses are 2+.  ABDOMEN: Normal bowel sounds, soft, non-tender abdomen, and no masses palpated.  SKIN: Inspection/Palpation of skin and subcutaneous tissue baseline w/ fragility. No wounds/rashes noted.   NEURO: CN II-XII at patient's baseline, sensation baseline PPS.  PSYCH: Insight, judgement, and memory are impaired at baseline, affect and mood are happy/engaged.    Lab/Diagnostic data: Recent labs in [facility] reviewed by me today.     ASSESSMENT/PLAN:  Fall, subsequent encounter  Other closed fracture of right pubis with routine healing, subsequent encounter  Depression, recurrent (H24)  ILD (interstitial lung disease) (H)  Chronic systolic congestive heart failure (H)  Mild dementia without behavioral disturbance, psychotic disturbance, mood disturbance, or anxiety, unspecified dementia type (H)  Paroxysmal atrial fibrillation (H)  Thrombocytopenia (H24)  Acute on chronic. Tenuous/complex.  Provider reviewed records from hospitalization, facility, and interpreted most recent imaging/lab work, and vital signs.  Notable history of falls and weakness, likely worsened or contributed to with dementia/depression.  Polypharmacy can also be contributory.  Appetite is good, therefore will discontinue multivitamin and vitamin C.  Historical anemia, but also experiencing constipation.  Will trial reduction in FeSO4 for better absorption and reduction in constipation.  Noting PPI necessity given dual dosing of aspirin and Plavix.  Will therefore lower Protonix to lowest, most effective dose.  Keegan continues to be at risk for falls, and therefore we will continue metabolic workup with CBC, BMP, magnesium, to evaluate for ongoing anemia, electrolyte disturbance, renal impairment, or other cause.  Unclear history of interstitial lung disease, but no notable hypoxia or  shortness of breath.  This could certainly contribute to his ability to rehab.  Atrial fibrillation is rate controlled with amiodarone, and is no longer anticoagulated heavily as he is status post Watchman device.  CHF appears to be controlled with the use of spironolactone, Toprol XL, and Lasix.  Continue to trend daily weights and symptoms as they arise.  Chronic underlying, but very mild thrombocytopenia which could likely be from aspirin/Plavix dosing.  Continue to monitor for signs of bleeding and will trend anemia as already noted.  Follow up w/in 1 week or as needed.    Orders:  Discontinue MVI.  Discontinue Vitamin C.  Decrease FeSO4 to 324mg PO Qday. Dx: anemia.   Decrease Protonix to 20mg PO Qday. Dx: PPI prophylaxis.  CBC, BMP, Mag x1 on 1/19. Dx: falls.     Electronically signed by:  RORY Castle CNP DNP                        Sincerely,        RORY Bernardo CNP

## 2024-01-15 NOTE — PROGRESS NOTES
[facility] TCU Admission  PCP & CLINIC: Issac Joshi MD, [address] [facility] / [facility] [address]  Chief Complaint   Patient presents with    Hospital F/U   [place] MRN: [id number]. Place of Service where encounter took place:  [facility] (TCU) [4002] Shaka Aguirre  is a 83 year old  (1940), admitted to the above facility from  [facility]. Hospital stay 1/6 through 1/12/24. Admitted to this facility for  rehab, medical management, and nursing care. HPI information obtained from: facility chart records, facility staff, patient report, [facility] chart review, and Care Everywhere [facility] chart review.     Brief Summary of Hospital Course: Keegan presented to [place] on 1/6 s/p fall at home and subsequent pelvic pain. He was found to have an acute mildly displaced fracture of the right inferior ramus and a non-displaced lateral right superior pubic ramus fx. He was treated non-surgically and discharged to TCU.     Updates since admission to transitional care unit: Keegan presented to TCU on 1/12/24 s/p the above hospitalization. Today, Keegan says things are going pretty good.  His appetite is okay, and he denies any nausea or heartburn.  He has a little bit of constipation, but this is normal for him.  He denies any bladder problems.  He denies a headache, but says he gets a little bit of lightheadedness upon standing.  He denies any new shortness of breath, but does feel overall weakness.  He denies a significant cough or fever.  He denies any palpitations or chest pain.  He does feel some right-sided hip and groin pain, and this sometimes radiates down his leg.  He does feel like he is having some mild muscle spasms, but all of these pains and spasms are intermittent and manageable.  He feels that therapy has started off well.  He denies any sleep disturbances.    CODE STATUS/ADVANCE DIRECTIVES DISCUSSION: CPR/Full code . Patient's living condition: lives alone. ALLERGIES: Pollen extract  PAST MEDICAL HISTORY:  has a past medical history of Age-related osteoporosis without current pathological fracture (8/9/2018), Biventricular ICD (implantable cardioverter-defibrillator) in place (10/23/2018), Chronic systolic congestive heart failure (H) (1/1/2019), Depression with anxiety (2/15/2018), Hypertension (2/15/2018), Hypothyroidism (acquired) (2/15/2018), Iron deficiency anemia secondary to inadequate dietary iron intake (10/18/2018), Mild dementia (H) (8/23/2022), MARISELA on CPAP (2/15/2018), Paroxysmal atrial fibrillation (H) (2/15/2018), Status post aortic valve replacement with bioprosthetic valve (9/22/2022), Status post mitral valve replacement with bioprosthetic valve (2/15/2018), and Thrombocytopenia (H) (8/25/2020).. PAST SURGICAL HISTORY:   has a past surgical history that includes aortic valve replacement; mitral valve replacement; Bypass graft artery coronary; Implant pacemaker; Egd (2018); colonoscopy (2018); Release trigger finger (Bilateral, 2020); Total Hip Arthroplasty (2011); Blepharoplasty; and Esophagoscopy, gastroscopy, duodenoscopy (EGD), combined (N/A, 9/23/2022).. FAMILY HISTORY: family history includes Asthma in his mother; Heart Failure in his father; Myocardial Infarction in his father.. SOCIAL HISTORY:   reports that he has never smoked. He has never used smokeless tobacco. He reports current alcohol use of about 7.0 standard drinks of alcohol per week.  Post Discharge Medication Reconciliation Status: discharge medications reconciled and changed, per note/orders.  Current Outpatient Medications   Medication Sig Dispense Refill    acetaminophen (TYLENOL) 500 MG tablet Take 1,000 mg by mouth 3 times daily      alendronate (FOSAMAX) 70 MG tablet Take 70 mg by mouth once a week      amiodarone (PACERONE) 100 MG TABS tablet Take 100 mg by mouth daily      amoxicillin (AMOXIL) 500 MG tablet Take 2,000 mg by mouth daily as needed      aspirin (ASA) 81 MG chewable tablet Take 81 mg by  mouth daily      atorvastatin (LIPITOR) 80 MG tablet Take 80 mg by mouth daily      Calcium Carb-Cholecalciferol (CALCIUM + VITAMIN D3 PO) Take 1 tablet by mouth 2 times daily      clopidogrel (PLAVIX) 75 MG tablet Take 75 mg by mouth daily      ferrous sulfate (FEROSUL) 325 (65 Fe) MG tablet Take 325 mg by mouth 2 times daily      FLUoxetine (PROZAC) 20 MG capsule Take 40 mg by mouth daily      furosemide (LASIX) 80 MG tablet Take 80 mg by mouth daily      levothyroxine (SYNTHROID/LEVOTHROID) 100 MCG tablet Take 100 mcg by mouth daily      magnesium oxide 400 MG CAPS Take 400 mg by mouth daily      metoprolol succinate ER (TOPROL XL) 25 MG 24 hr tablet Take 25 mg by mouth daily      naloxone (NARCAN) 0.4 MG/ML injection Inject 0.1-0.4 mg into the vein as needed for opioid reversal      pantoprazole (PROTONIX) 20 MG EC tablet Take 20 mg by mouth daily      spironolactone (ALDACTONE) 25 MG tablet Take 12.5 mg by mouth      tiZANidine (ZANAFLEX) 2 MG tablet Take 2 mg by mouth At Bedtime      Vitamin D3 (CHOLECALCIFEROL) 25 mcg (1000 units) tablet Take 25 mcg by mouth daily       ROS: Limited secondary to cognitive impairment but today pt reports the above and 10 point ROS of systems including Constitutional, Eyes, Respiratory, Cardiovascular, Gastroenterology, Genitourinary, Integumentary, Musculoskeletal, Psychiatric were all negative except for pertinent positives noted in my HPI.    Vitals: /69   Pulse 80   Temp 97.8  F (36.6  C)   Resp 18   Wt 60.1 kg (132 lb 8 oz)   SpO2 94%   BMI 25.04 kg/m    Exam:  GENERAL APPEARANCE: Alert, in no distress, cooperative.   ENT: Mouth/posterior oropharynx intact w/ moist mucous membranes, hearing acuity [place].  EYES: EOM, conjunctivae, lids, pupils and irises normal, PERRL2.   RESP: Respiratory effort good, no respiratory distress, Lung sounds clear/diminished.  On RA.   CV: Auscultation of heart reveals S1, S2, rate and rhythm paced, 2/6 soft systolic murmur, no rub  or gallop, Edema 0+ BLE. Peripheral pulses are 2+.  ABDOMEN: Normal bowel sounds, soft, non-tender abdomen, and no masses palpated.  SKIN: Inspection/Palpation of skin and subcutaneous tissue baseline w/ fragility. No wounds/rashes noted.   NEURO: CN II-XII at patient's baseline, sensation baseline PPS.  PSYCH: Insight, judgement, and memory are impaired at baseline, affect and mood are happy/engaged.    Lab/Diagnostic data: Recent labs in [facility] reviewed by me today.     ASSESSMENT/PLAN:  Fall, subsequent encounter  Other closed fracture of right pubis with routine healing, subsequent encounter  Depression, recurrent (H24)  ILD (interstitial lung disease) (H)  Chronic systolic congestive heart failure (H)  Mild dementia without behavioral disturbance, psychotic disturbance, mood disturbance, or anxiety, unspecified dementia type (H)  Paroxysmal atrial fibrillation (H)  Thrombocytopenia (H24)  Acute on chronic. Tenuous/complex.  Provider reviewed records from hospitalization, facility, and interpreted most recent imaging/lab work, and vital signs.  Notable history of falls and weakness, likely worsened or contributed to with dementia/depression.  Polypharmacy can also be contributory.  Appetite is good, therefore will discontinue multivitamin and vitamin C.  Historical anemia, but also experiencing constipation.  Will trial reduction in FeSO4 for better absorption and reduction in constipation.  Noting PPI necessity given dual dosing of aspirin and Plavix.  Will therefore lower Protonix to lowest, most effective dose.  Keegan continues to be at risk for falls, and therefore we will continue metabolic workup with CBC, BMP, magnesium, to evaluate for ongoing anemia, electrolyte disturbance, renal impairment, or other cause.  Unclear history of interstitial lung disease, but no notable hypoxia or shortness of breath.  This could certainly contribute to his ability to rehab.  Atrial fibrillation is rate controlled with  amiodarone, and is no longer anticoagulated heavily as he is status post Watchman device.  CHF appears to be controlled with the use of spironolactone, Toprol XL, and Lasix.  Continue to trend daily weights and symptoms as they arise.  Chronic underlying, but very mild thrombocytopenia which could likely be from aspirin/Plavix dosing.  Continue to monitor for signs of bleeding and will trend anemia as already noted.  Follow up w/in 1 week or as needed.    Orders:  Discontinue MVI.  Discontinue Vitamin C.  Decrease FeSO4 to 324mg PO Qday. Dx: anemia.   Decrease Protonix to 20mg PO Qday. Dx: PPI prophylaxis.  CBC, BMP, Mag x1 on 1/19. Dx: falls.     Electronically signed by:  Dr. Tressa Marie, RORY CNP DNP

## 2024-01-16 NOTE — PROGRESS NOTES
[facility] GERIATRICS    PRIMARY CARE PROVIDER AND CLINIC:  Issac Joshi MD, [address] [facility] [address]  Chief Complaint   Patient presents with    Hospital F/U      [facility] Medical Record Number:  [id number]  Place of Service where encounter took place:  [facility] (TCU) [[id number]]    Shaka Aguirre  is a 83 year old  (1940), admitted to the above facility from  [facility]. Hospital stay 1/6/24 through 1/12/24..   HPI: Patient had a fall at home and subsequent pelvic pain, found to have acute mildly displaced fracture of right inferior rami and nondisplaced lateral right superior pubic ramus fracture.  Managed conservatively.  Evaluated by OT/PT and felt to benefit from TCU placement.    Today:  - Pelvic fracture:  - Rehab: noticeable improvement in the last three days.    - Cardiac:  - Hx of GIB, PPI GDR started:    ======================================================    CODE STATUS/ADVANCE DIRECTIVES DISCUSSION:  Full Code    ALLERGIES:   Allergies   Allergen Reactions    Pollen Extract       PAST MEDICAL HISTORY:   Past Medical History:   Diagnosis Date    Age-related osteoporosis without current pathological fracture 8/9/2018    Overview:  DEXA at [facility] 6/28/18.  L femur -3.1.    Biventricular ICD (implantable cardioverter-defibrillator) in place 10/23/2018    Overview:  Date of last device in office evaluation: 7/27/18  ?  and model:   [employer] Inogen G146  BiV ICD  * Date of implant: 12/20/16             ? Indication for device: CHF; cardiomyopathy; AVR,MVR; atrial flutter/fibrillation; ventricular tachycardia; 100% V paced  ? Cardiac resynchronization therapy:  yes ? Battery longevity documented as less than 3  Months: no ? Are a    Chronic systolic congestive heart failure (H) 1/1/2019    Depression with anxiety 2/15/2018    Hypertension 2/15/2018    Hypothyroidism (acquired) 2/15/2018    Iron deficiency anemia secondary to inadequate  dietary iron intake 10/18/2018    Formatting of this note might be different from the original. Negative EGD, colonoscopy, PillCam.    Mild dementia (H) 8/23/2022    Formatting of this note might be different from the original. 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    MARISELA on CPAP 2/15/2018    Paroxysmal atrial fibrillation (H) 2/15/2018    Status post aortic valve replacement with bioprosthetic valve 9/22/2022    Status post mitral valve replacement with bioprosthetic valve 2/15/2018    Thrombocytopenia (H24) 8/25/2020      PAST SURGICAL HISTORY:   has a past surgical history that includes aortic valve replacement; mitral valve replacement; Bypass graft artery coronary; Implant pacemaker; Egd (2018); colonoscopy (2018); Release trigger finger (Bilateral, 2020); Total Hip Arthroplasty (2011); Blepharoplasty; and Esophagoscopy, gastroscopy, duodenoscopy (EGD), combined (N/A, 9/23/2022).  FAMILY HISTORY: family history includes Asthma in his mother; Heart Failure in his father; Myocardial Infarction in his father.  SOCIAL HISTORY:   reports that he has never smoked. He has never used smokeless tobacco. He reports current alcohol use of about 7.0 standard drinks of alcohol per week.  Patient's living condition: lives alone    Post Discharge Medication Reconciliation Status:   MED REC REQUIRED  Post Medication Reconciliation Status: discharge medications reconciled and changed, per note/orders       Current Outpatient Medications   Medication Sig    acetaminophen (TYLENOL) 500 MG tablet Take 1,000 mg by mouth 3 times daily    alendronate (FOSAMAX) 70 MG tablet Take 70 mg by mouth once a week    amiodarone (PACERONE) 100 MG TABS tablet Take 100 mg by mouth daily    amoxicillin (AMOXIL) 500 MG tablet Take 2,000 mg by mouth daily as needed    aspirin (ASA) 81 MG chewable tablet Take 81 mg by mouth daily    atorvastatin (LIPITOR) 80 MG tablet Take 80 mg by mouth  "daily    Calcium Carb-Cholecalciferol (CALCIUM + VITAMIN D3 PO) Take 1 tablet by mouth 2 times daily    clopidogrel (PLAVIX) 75 MG tablet Take 75 mg by mouth daily    ferrous sulfate (FEROSUL) 325 (65 Fe) MG tablet Take 325 mg by mouth 2 times daily    FLUoxetine (PROZAC) 20 MG capsule Take 40 mg by mouth daily    furosemide (LASIX) 80 MG tablet Take 80 mg by mouth daily    levothyroxine (SYNTHROID/LEVOTHROID) 100 MCG tablet Take 100 mcg by mouth daily    magnesium oxide 400 MG CAPS Take 400 mg by mouth daily    metoprolol succinate ER (TOPROL XL) 25 MG 24 hr tablet Take 25 mg by mouth daily    naloxone (NARCAN) 0.4 MG/ML injection Inject 0.1-0.4 mg into the vein as needed for opioid reversal    pantoprazole (PROTONIX) 20 MG EC tablet Take 20 mg by mouth daily    spironolactone (ALDACTONE) 25 MG tablet Take 12.5 mg by mouth    tiZANidine (ZANAFLEX) 2 MG tablet Take 2 mg by mouth At Bedtime    Vitamin D3 (CHOLECALCIFEROL) 25 mcg (1000 units) tablet Take 25 mcg by mouth daily     No current facility-administered medications for this visit.       ROS:  10 point ROS of systems including Constitutional, Eyes, Respiratory, Cardiovascular, Gastroenterology, Genitourinary, Integumentary, Musculoskeletal, Psychiatric were all negative except for pertinent positives noted in my HPI.    Vitals:  /64   Pulse 60   Temp 98.2  F (36.8  C)   Resp 12   Ht 1.549 m (5' 1\")   Wt 60.4 kg (133 lb 1.6 oz)   SpO2 98%   BMI 25.15 kg/m    Exam:  GENERAL APPEARANCE:  in no distress,   RESP:  Unlabored breathing. CTA b/l.   CV:  S1S2 audible, regular HR, no murmur appreciated.   ABDOMEN:  soft, NT/ND, BS audible.   M/S:   no joint deformity noted on observation.   SKIN:  No rash noted on observation  NEURO:   No NFD appreciated on observation.   PSYCH:  affect and mood normal      Lab/Diagnostic data: Reviewed in the chart and EHR.        ASSESSMENT/PLAN:    Fall, subsequent encounter  Other closed fracture of right pubis with " routine healing, subsequent encounter  Age-related osteoporosis without current pathological fracture  - Patient had a fall at home and subsequent pelvic pain, found to have acute mildly displaced fracture of right inferior rami and nondisplaced lateral right superior pubic ramus fracture.  Managed conservatively.m   - walks in the room SBA, in general making a progress.   - Analgesia optimal with the current regimen. Continue present plan and medications.  - Followed by Orthopedic Team. Follow on the recommendations / instructions.   - DVT prophylaxis according to Orthopedic team recommendations  - Started rehab program, making a progress, continue until desired goal is achieved.       Chronic atrial fibrillation (H)  Primary hypertension  Chronic systolic congestive heart failure (H)  Biventricular ICD (implantable cardioverter-defibrillator) in place  - cardiac wise appears compensated. Continue current regimen. Cardiology routine follow up      Depression, recurrent (H24)  Cognitive deficit  Vivid dream  - adjusting well. However has been having vivid dream since his hospitalization, off and on through. Sees worm on  the wall, walls shrunk to the floor, his wife next to him without  head. He has to shake his head and rub his ryes when he walks up to get better. Pt is not on any new psychotropic meds. Has been on tizanidine for a very long time,  and the only recent change was Prozac dose that was increased two months ago as reported by his wife.   - we offered reducing tizanidine, but wife is reluctant to do that for he needs something for his pain.   - we counseled the pt/wife that then it is better to wait unitl Keegan goes back home and see how he will do. If this persists, consider discussing this with PCP.       History of gastrointestinal bleeding  - Pantoprazole GDR initiated. Tolerating.       Orders:  NNO    Electronically signed by:  Cooper Wheeler MD

## 2024-01-18 ENCOUNTER — LAB REQUISITION (OUTPATIENT)
Dept: LAB | Facility: CLINIC | Age: 84
End: 2024-01-18
Payer: COMMERCIAL

## 2024-01-18 ENCOUNTER — TRANSITIONAL CARE UNIT VISIT (OUTPATIENT)
Dept: GERIATRICS | Facility: CLINIC | Age: 84
End: 2024-01-18
Payer: COMMERCIAL

## 2024-01-18 VITALS
WEIGHT: 133.1 LBS | DIASTOLIC BLOOD PRESSURE: 64 MMHG | HEIGHT: 61 IN | TEMPERATURE: 98.2 F | OXYGEN SATURATION: 98 % | BODY MASS INDEX: 25.13 KG/M2 | RESPIRATION RATE: 12 BRPM | SYSTOLIC BLOOD PRESSURE: 110 MMHG | HEART RATE: 60 BPM

## 2024-01-18 DIAGNOSIS — F33.9 DEPRESSION, RECURRENT (H): ICD-10-CM

## 2024-01-18 DIAGNOSIS — I10 PRIMARY HYPERTENSION: ICD-10-CM
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
## 2024-01-18 DIAGNOSIS — R68.89 VIVID DREAM: ICD-10-CM

## 2024-01-18 DIAGNOSIS — S32.591D OTHER CLOSED FRACTURE OF RIGHT PUBIS WITH ROUTINE HEALING, SUBSEQUENT ENCOUNTER: ICD-10-CM

## 2024-01-18 DIAGNOSIS — R29.6 REPEATED FALLS: ICD-10-CM

## 2024-01-18 DIAGNOSIS — Z95.810 BIVENTRICULAR ICD (IMPLANTABLE CARDIOVERTER-DEFIBRILLATOR) IN PLACE: ICD-10-CM

## 2024-01-18 DIAGNOSIS — I50.22 CHRONIC SYSTOLIC CONGESTIVE HEART FAILURE (H): ICD-10-CM

## 2024-01-18 DIAGNOSIS — I48.20 CHRONIC ATRIAL FIBRILLATION (H): ICD-10-CM

## 2024-01-18 DIAGNOSIS — M81.0 AGE-RELATED OSTEOPOROSIS WITHOUT CURRENT PATHOLOGICAL FRACTURE: ICD-10-CM

## 2024-01-18 DIAGNOSIS — Z87.19 HISTORY OF GASTROINTESTINAL BLEEDING: ICD-10-CM

## 2024-01-18 DIAGNOSIS — R41.89 COGNITIVE DEFICITS: ICD-10-CM

## 2024-01-18 PROCEDURE — 99305 1ST NF CARE MODERATE MDM 35: CPT | Performed by: FAMILY MEDICINE

## 2024-01-18 NOTE — LETTER
1/18/2024        RE: Shaka Aguirre  [address]  [place] [facility] [address]        [facility] GERIATRICS    PRIMARY CARE PROVIDER AND CLINIC:  Issac Joshi MD, [address] [facility] [address]  Chief Complaint   Patient presents with     Hospital F/U      [place] Medical Record Number:  [id number]  Place of Service where encounter took place:  [facility] ([facility]) [[id number]]    Shaka Aguirre  is a 83 year old  (1940), admitted to the above facility from  [facility]. Hospital stay 1/6/24 through 1/12/24..   HPI: Patient had a fall at home and subsequent pelvic pain, found to have acute mildly displaced fracture of right inferior rami and nondisplaced lateral right superior pubic ramus fracture.  Managed conservatively.  Evaluated by OT/PT and felt to benefit from TCU placement.    Today:  - Pelvic fracture:  - Rehab: noticeable improvement in the last three days.    - Cardiac:  - Hx of GIB, PPI GDR started:    ======================================================    CODE STATUS/ADVANCE DIRECTIVES DISCUSSION:  Full Code    ALLERGIES:   Allergies   Allergen Reactions     Pollen Extract       PAST MEDICAL HISTORY:   Past Medical History:   Diagnosis Date     Age-related osteoporosis without current pathological fracture 8/9/2018    Overview:  DEXA at [place] 6/28/18.  L femur -3.1.     Biventricular ICD (implantable cardioverter-defibrillator) in place 10/23/2018    Overview:  Date of last device in office evaluation: 7/27/18  ?  and model:   [employer] Inogen G146  BiV ICD  * Date of implant: 12/20/16             ? Indication for device: CHF; cardiomyopathy; AVR,MVR; atrial flutter/fibrillation; ventricular tachycardia; 100% V paced  ? Cardiac resynchronization therapy:  yes ? Battery longevity documented as less than 3  Months: no ? Are a     Chronic systolic congestive heart failure (H) 1/1/2019     Depression with anxiety 2/15/2018      Hypertension 2/15/2018     Hypothyroidism (acquired) 2/15/2018     Iron deficiency anemia secondary to inadequate dietary iron intake 10/18/2018    Formatting of this note might be different from the original. Negative EGD, colonoscopy, PillCam.     Mild dementia (H) 8/23/2022    Formatting of this note might be different from the original. 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     MARISELA on CPAP 2/15/2018     Paroxysmal atrial fibrillation (H) 2/15/2018     Status post aortic valve replacement with bioprosthetic valve 9/22/2022     Status post mitral valve replacement with bioprosthetic valve 2/15/2018     Thrombocytopenia (H24) 8/25/2020      PAST SURGICAL HISTORY:   has a past surgical history that includes aortic valve replacement; mitral valve replacement; Bypass graft artery coronary; Implant pacemaker; Egd (2018); colonoscopy (2018); Release trigger finger (Bilateral, 2020); Total Hip Arthroplasty (2011); Blepharoplasty; and Esophagoscopy, gastroscopy, duodenoscopy (EGD), combined (N/A, 9/23/2022).  FAMILY HISTORY: family history includes Asthma in his mother; Heart Failure in his father; Myocardial Infarction in his father.  SOCIAL HISTORY:   reports that he has never smoked. He has never used smokeless tobacco. He reports current alcohol use of about 7.0 standard drinks of alcohol per week.  Patient's living condition: lives alone    Post Discharge Medication Reconciliation Status:   MED REC REQUIRED  Post Medication Reconciliation Status: discharge medications reconciled and changed, per note/orders       Current Outpatient Medications   Medication Sig     acetaminophen (TYLENOL) 500 MG tablet Take 1,000 mg by mouth 3 times daily     alendronate (FOSAMAX) 70 MG tablet Take 70 mg by mouth once a week     amiodarone (PACERONE) 100 MG TABS tablet Take 100 mg by mouth daily     amoxicillin (AMOXIL) 500 MG tablet Take 2,000 mg by mouth daily as needed      "aspirin (ASA) 81 MG chewable tablet Take 81 mg by mouth daily     atorvastatin (LIPITOR) 80 MG tablet Take 80 mg by mouth daily     Calcium Carb-Cholecalciferol (CALCIUM + VITAMIN D3 PO) Take 1 tablet by mouth 2 times daily     clopidogrel (PLAVIX) 75 MG tablet Take 75 mg by mouth daily     ferrous sulfate (FEROSUL) 325 (65 Fe) MG tablet Take 325 mg by mouth 2 times daily     FLUoxetine (PROZAC) 20 MG capsule Take 40 mg by mouth daily     furosemide (LASIX) 80 MG tablet Take 80 mg by mouth daily     levothyroxine (SYNTHROID/LEVOTHROID) 100 MCG tablet Take 100 mcg by mouth daily     magnesium oxide 400 MG CAPS Take 400 mg by mouth daily     metoprolol succinate ER (TOPROL XL) 25 MG 24 hr tablet Take 25 mg by mouth daily     naloxone (NARCAN) 0.4 MG/ML injection Inject 0.1-0.4 mg into the vein as needed for opioid reversal     pantoprazole (PROTONIX) 20 MG EC tablet Take 20 mg by mouth daily     spironolactone (ALDACTONE) 25 MG tablet Take 12.5 mg by mouth     tiZANidine (ZANAFLEX) 2 MG tablet Take 2 mg by mouth At Bedtime     Vitamin D3 (CHOLECALCIFEROL) 25 mcg (1000 units) tablet Take 25 mcg by mouth daily     No current facility-administered medications for this visit.       ROS:  10 point ROS of systems including Constitutional, Eyes, Respiratory, Cardiovascular, Gastroenterology, Genitourinary, Integumentary, Musculoskeletal, Psychiatric were all negative except for pertinent positives noted in my HPI.    Vitals:  /64   Pulse 60   Temp 98.2  F (36.8  C)   Resp 12   Ht 1.549 m (5' 1\")   Wt 60.4 kg (133 lb 1.6 oz)   SpO2 98%   BMI 25.15 kg/m    Exam:  GENERAL APPEARANCE:  in no distress,   RESP:  Unlabored breathing. CTA b/l.   CV:  S1S2 audible, regular HR, no murmur appreciated.   ABDOMEN:  soft, NT/ND, BS audible.   M/S:   no joint deformity noted on observation.   SKIN:  No rash noted on observation  NEURO:   No NFD appreciated on observation.   PSYCH:  affect and mood normal      Lab/Diagnostic " data: Reviewed in the chart and EHR.        ASSESSMENT/PLAN:    Fall, subsequent encounter  Other closed fracture of right pubis with routine healing, subsequent encounter  Age-related osteoporosis without current pathological fracture  - Patient had a fall at home and subsequent pelvic pain, found to have acute mildly displaced fracture of right inferior rami and nondisplaced lateral right superior pubic ramus fracture.  Managed conservatively.m   - walks in the room SBA, in general making a progress.   - Analgesia optimal with the current regimen. Continue present plan and medications.  - Followed by Orthopedic Team. Follow on the recommendations / instructions.   - DVT prophylaxis according to Orthopedic team recommendations  - Started rehab program, making a progress, continue until desired goal is achieved.       Chronic atrial fibrillation (H)  Primary hypertension  Chronic systolic congestive heart failure (H)  Biventricular ICD (implantable cardioverter-defibrillator) in place  - cardiac wise appears compensated. Continue current regimen. Cardiology routine follow up      Depression, recurrent (H24)  Cognitive deficit  Vivid dream  - adjusting well. However has been having vivid dream since his hospitalization, off and on through. Sees worm on  the wall, walls shrunk to the floor, his wife next to him without  head. He has to shake his head and rub his ryes when he walks up to get better. Pt is not on any new psychotropic meds. Has been on tizanidine for a very long time,  and the only recent change was Prozac dose that was increased two months ago as reported by his wife.   - we offered reducing tizanidine, but wife is reluctant to do that for he needs something for his pain.   - we counseled the pt/wife that then it is better to wait berhane Allison goes back home and see how he will do. If this persists, consider discussing this with PCP.       History of gastrointestinal bleeding  - Pantoprazole GDR  initiated. Tolerating.       Orders:  NNO    Electronically signed by:  Cooper Wheeler MD                     Sincerely,        Cooper Wheeler MD

## 2024-01-19 LAB
ANION GAP SERPL CALCULATED.3IONS-SCNC: 13 MMOL/L (ref 7–15)
BUN SERPL-MCNC: 20.2 MG/DL (ref 8–23)
CALCIUM SERPL-MCNC: 9.3 MG/DL (ref 8.8–10.2)
CHLORIDE SERPL-SCNC: 95 MMOL/L (ref 98–107)
CREAT SERPL-MCNC: 0.83 MG/DL (ref 0.67–1.17)
DEPRECATED HCO3 PLAS-SCNC: 23 MMOL/L (ref 22–29)
EGFRCR SERPLBLD CKD-EPI 2021: 86 ML/MIN/1.73M2
ERYTHROCYTE [DISTWIDTH] IN BLOOD BY AUTOMATED COUNT: 12.7 % (ref 10–15)
GLUCOSE SERPL-MCNC: 114 MG/DL (ref 70–99)
HCT VFR BLD AUTO: 38.9 % (ref 40–53)
HGB BLD-MCNC: 13.1 G/DL (ref 13.3–17.7)
MAGNESIUM SERPL-MCNC: 2.1 MG/DL (ref 1.7–2.3)
MCH RBC QN AUTO: 32.3 PG (ref 26.5–33)
MCHC RBC AUTO-ENTMCNC: 33.7 G/DL (ref 31.5–36.5)
MCV RBC AUTO: 96 FL (ref 78–100)
PLATELET # BLD AUTO: 168 10E3/UL (ref 150–450)
POTASSIUM SERPL-SCNC: 4 MMOL/L (ref 3.4–5.3)
RBC # BLD AUTO: 4.05 10E6/UL (ref 4.4–5.9)
SODIUM SERPL-SCNC: 131 MMOL/L (ref 135–145)
WBC # BLD AUTO: 5.5 10E3/UL (ref 4–11)

## 2024-01-19 PROCEDURE — 80048 BASIC METABOLIC PNL TOTAL CA: CPT | Performed by: NURSE PRACTITIONER

## 2024-01-19 PROCEDURE — 83735 ASSAY OF MAGNESIUM: CPT | Performed by: NURSE PRACTITIONER

## 2024-01-19 PROCEDURE — 85027 COMPLETE CBC AUTOMATED: CPT | Performed by: NURSE PRACTITIONER

## 2024-01-19 PROCEDURE — 36415 COLL VENOUS BLD VENIPUNCTURE: CPT | Performed by: NURSE PRACTITIONER

## 2024-01-19 PROCEDURE — P9603 ONE-WAY ALLOW PRORATED MILES: HCPCS | Performed by: NURSE PRACTITIONER

## 2024-01-23 ENCOUNTER — TRANSITIONAL CARE UNIT VISIT (OUTPATIENT)
Dept: GERIATRICS | Facility: CLINIC | Age: 84
End: 2024-01-23
Payer: COMMERCIAL

## 2024-01-23 VITALS
TEMPERATURE: 97.5 F | OXYGEN SATURATION: 97 % | RESPIRATION RATE: 18 BRPM | SYSTOLIC BLOOD PRESSURE: 111 MMHG | HEIGHT: 67 IN | DIASTOLIC BLOOD PRESSURE: 64 MMHG | WEIGHT: 129 LBS | HEART RATE: 97 BPM | BODY MASS INDEX: 20.25 KG/M2

## 2024-01-23 DIAGNOSIS — I10 PRIMARY HYPERTENSION: ICD-10-CM

## 2024-01-23 DIAGNOSIS — I48.20 CHRONIC ATRIAL FIBRILLATION (H): ICD-10-CM
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
## 2024-01-23 DIAGNOSIS — M81.0 AGE-RELATED OSTEOPOROSIS WITHOUT CURRENT PATHOLOGICAL FRACTURE: ICD-10-CM

## 2024-01-23 DIAGNOSIS — E87.1 HYPONATREMIA: ICD-10-CM

## 2024-01-23 DIAGNOSIS — S32.591D OTHER CLOSED FRACTURE OF RIGHT PUBIS WITH ROUTINE HEALING, SUBSEQUENT ENCOUNTER: ICD-10-CM

## 2024-01-23 PROBLEM — J84.9 ILD (INTERSTITIAL LUNG DISEASE) (H): Status: ACTIVE | Noted: 2023-01-19

## 2024-01-23 PROBLEM — D50.8 OTHER IRON DEFICIENCY ANEMIAS: Status: ACTIVE | Noted: 2024-01-23

## 2024-01-23 PROBLEM — S32.9XXA FRACTURE OF PELVIS (H): Status: ACTIVE | Noted: 2024-01-06

## 2024-01-23 PROBLEM — R09.02 HYPOXIA: Status: ACTIVE | Noted: 2024-01-06

## 2024-01-23 PROBLEM — I34.0 MITRAL REGURGITATION: Status: ACTIVE | Noted: 2023-04-26

## 2024-01-23 PROBLEM — Z95.818 PRESENCE OF WATCHMAN LEFT ATRIAL APPENDAGE CLOSURE DEVICE: Status: ACTIVE | Noted: 2023-08-30

## 2024-01-23 PROCEDURE — 99308 SBSQ NF CARE LOW MDM 20: CPT | Performed by: NURSE PRACTITIONER

## 2024-01-23 NOTE — LETTER
1/23/2024        RE: Shaka Aguirre  [address]  [place] [facility] [address]        [facility] GERIATRIC SERVICE  Episodic/Acute/Follow-Up  [place] MRN: [id number]. [facility] [[id number][facility]   Chief Complaint   Patient presents with     RECHECK    HPI: Shaka Aguirre  is a 83 year old (1940), who is being seen today for an episodic care visit. Today's concern is:    Keegan seen today on routine follow-up as he continues to rehab in TCU status post 2 point pelvic fractures from fall.  Today, he says he still has right hip and groin pain that does not radiate.  He denies numbness or tingling.  He denies any new edema.  He says his appetite is fair, but he does not like the food very much.  He denies nausea or heartburn.  He denies any constipation or diarrhea.  He does still have some lightheadedness upon rising, and he feels therapy is going pretty well.  He says the lidocaine ointment helps much better than the patches.    Past Medical and Surgical History reviewed in Epic today.  MEDICATIONS:  Current Outpatient Medications   Medication Sig Dispense Refill     acetaminophen (TYLENOL) 500 MG tablet Take 1,000 mg by mouth 3 times daily       alendronate (FOSAMAX) 70 MG tablet Take 70 mg by mouth once a week       amiodarone (PACERONE) 100 MG TABS tablet Take 100 mg by mouth daily       amoxicillin (AMOXIL) 500 MG tablet Take 2,000 mg by mouth daily as needed       aspirin (ASA) 81 MG chewable tablet Take 81 mg by mouth daily       atorvastatin (LIPITOR) 80 MG tablet Take 80 mg by mouth daily       Calcium Carb-Cholecalciferol (CALCIUM + VITAMIN D3 PO) Take 1 tablet by mouth 2 times daily       clopidogrel (PLAVIX) 75 MG tablet Take 75 mg by mouth daily       ferrous sulfate (FEROSUL) 325 (65 Fe) MG tablet Take 325 mg by mouth 2 times daily       FLUoxetine (PROZAC) 20 MG capsule Take 40 mg by mouth daily       furosemide  "(LASIX) 80 MG tablet Take 80 mg by mouth daily       levothyroxine (SYNTHROID/LEVOTHROID) 100 MCG tablet Take 100 mcg by mouth daily       magnesium oxide 400 MG CAPS Take 400 mg by mouth daily       metoprolol succinate ER (TOPROL XL) 25 MG 24 hr tablet Take 25 mg by mouth daily       naloxone (NARCAN) 0.4 MG/ML injection Inject 0.1-0.4 mg into the vein as needed for opioid reversal       pantoprazole (PROTONIX) 20 MG EC tablet Take 20 mg by mouth daily       spironolactone (ALDACTONE) 25 MG tablet Take 12.5 mg by mouth       tiZANidine (ZANAFLEX) 2 MG tablet Take 2 mg by mouth At Bedtime       Vitamin D3 (CHOLECALCIFEROL) 25 mcg (1000 units) tablet Take 25 mcg by mouth daily       Objective: /64   Pulse 97   Temp 97.5  F (36.4  C)   Resp 18   Ht 1.702 m (5' 7\")   Wt 58.5 kg (129 lb)   SpO2 97%   BMI 20.20 kg/m    Exam:  GENERAL APPEARANCE: Alert, in no distress, cooperative.   RESP: Respiratory effort good, no respiratory distress, Lung sounds clear. On RA.   CV: Auscultation of heart reveals S1, S2, rate controlled and rhythm irregular, 2/6 soft systolic murmur, no rub or gallop, Edema 0+ BLE. Peripheral pulses are 2+.  PSYCH: Insight, judgement, and memory are impaired at baseline, affect and mood are happy/engaged.    Labs: Labs done in facility are in EPIC. Please refer to them using Personaling/Care Everywhere.    ASSESSMENT/PLAN:  Fall, subsequent encounter  Other closed fracture of right pubis with routine healing, subsequent encounter  Age-related osteoporosis without current pathological fracture  Chronic atrial fibrillation (H)  Primary hypertension  Hyponatremia  Acute on chronic. Ongoing.  Provider reviewed records from facility, and interpreted most recent imaging/lab work, and vital signs.  Noting some mild lightheadedness and fall risk.  Also noting previous hyponatremia on most recent blood work.  Will trend sodium level as noted below as this can contribute to future falls, fatigue, and " altered mental status.  Some soft pressures, but no evidence of profound hypotension.  Atrial fibrillation appears to be rate controlled with pacer.  Provider coordinated care with rehabilitation services, , and nursing during interdisciplinary rounds.  Apparently, patient was driving prior to his fall.  Requested DMV form be filled out to notify them of his deficits.  Do not believe he should resume driving.  Follow up w/in 1-2 weeks or as needed.    Orders:  Recheck sodium level x1 on 1/26. Dx: hyponatremia.    Electronically signed by:  RORY Castle CNP DNP        Sincerely,        RORY Bernardo CNP
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
## 2024-01-25 ENCOUNTER — LAB REQUISITION (OUTPATIENT)
Dept: LAB | Facility: CLINIC | Age: 84
End: 2024-01-25
Payer: COMMERCIAL

## 2024-01-25 DIAGNOSIS — E87.1 HYPO-OSMOLALITY AND HYPONATREMIA: ICD-10-CM

## 2024-01-26 LAB — SODIUM SERPL-SCNC: 136 MMOL/L (ref 135–145)

## 2024-01-26 PROCEDURE — 84295 ASSAY OF SERUM SODIUM: CPT | Mod: ORL | Performed by: NURSE PRACTITIONER

## 2024-01-26 PROCEDURE — 36415 COLL VENOUS BLD VENIPUNCTURE: CPT | Mod: ORL | Performed by: NURSE PRACTITIONER

## 2024-01-26 PROCEDURE — P9603 ONE-WAY ALLOW PRORATED MILES: HCPCS | Mod: ORL | Performed by: NURSE PRACTITIONER

## 2024-03-15 ENCOUNTER — DOCUMENTATION ONLY (OUTPATIENT)
Dept: GERIATRICS | Facility: CLINIC | Age: 84
End: 2024-03-15
Payer: COMMERCIAL

## (undated) RX ORDER — LIDOCAINE HYDROCHLORIDE 10 MG/ML
INJECTION, SOLUTION EPIDURAL; INFILTRATION; INTRACAUDAL; PERINEURAL
Status: DISPENSED
Start: 2018-06-29